# Patient Record
Sex: FEMALE | Race: WHITE | Employment: PART TIME | URBAN - NONMETROPOLITAN AREA
[De-identification: names, ages, dates, MRNs, and addresses within clinical notes are randomized per-mention and may not be internally consistent; named-entity substitution may affect disease eponyms.]

---

## 2021-12-14 ENCOUNTER — HOSPITAL ENCOUNTER (INPATIENT)
Age: 21
LOS: 6 days | Discharge: HOME | DRG: 885 | End: 2021-12-20
Attending: PSYCHIATRY & NEUROLOGY | Admitting: PSYCHIATRY & NEUROLOGY
Payer: MEDICAID

## 2021-12-14 DIAGNOSIS — R45.851 SUICIDAL IDEATION: Primary | ICD-10-CM

## 2021-12-14 DIAGNOSIS — T74.21XA SEXUAL ASSAULT OF ADULT, INITIAL ENCOUNTER: ICD-10-CM

## 2021-12-14 LAB
ACETAMINOPHEN LEVEL: <15 UG/ML
ALBUMIN SERPL-MCNC: 4.2 G/DL (ref 3.5–5.2)
ALP BLD-CCNC: 68 U/L (ref 35–104)
ALT SERPL-CCNC: 9 U/L (ref 5–33)
AMPHETAMINE SCREEN, URINE: NEGATIVE
ANION GAP SERPL CALCULATED.3IONS-SCNC: 9 MMOL/L (ref 7–19)
AST SERPL-CCNC: 12 U/L (ref 5–32)
BARBITURATE SCREEN URINE: NEGATIVE
BASOPHILS ABSOLUTE: 0 K/UL (ref 0–0.2)
BASOPHILS RELATIVE PERCENT: 0.3 % (ref 0–1)
BENZODIAZEPINE SCREEN, URINE: NEGATIVE
BILIRUB SERPL-MCNC: <0.2 MG/DL (ref 0.2–1.2)
BILIRUBIN URINE: NEGATIVE
BLOOD, URINE: NEGATIVE
BUN BLDV-MCNC: 7 MG/DL (ref 6–20)
CALCIUM SERPL-MCNC: 9 MG/DL (ref 8.6–10)
CANNABINOID SCREEN URINE: POSITIVE
CHLORIDE BLD-SCNC: 104 MMOL/L (ref 98–111)
CLARITY: CLEAR
CO2: 24 MMOL/L (ref 22–29)
COCAINE METABOLITE SCREEN URINE: NEGATIVE
COLOR: YELLOW
CREAT SERPL-MCNC: 0.8 MG/DL (ref 0.5–0.9)
EOSINOPHILS ABSOLUTE: 0.1 K/UL (ref 0–0.6)
EOSINOPHILS RELATIVE PERCENT: 1.8 % (ref 0–5)
ETHANOL: <10 MG/DL (ref 0–0.08)
GFR AFRICAN AMERICAN: >59
GFR NON-AFRICAN AMERICAN: >60
GLUCOSE BLD-MCNC: 104 MG/DL (ref 74–109)
GLUCOSE URINE: NEGATIVE MG/DL
HCG QUALITATIVE: NEGATIVE
HCT VFR BLD CALC: 42.9 % (ref 37–47)
HEMOGLOBIN: 13.5 G/DL (ref 12–16)
IMMATURE GRANULOCYTES #: 0 K/UL
KETONES, URINE: NEGATIVE MG/DL
LEUKOCYTE ESTERASE, URINE: NEGATIVE
LYMPHOCYTES ABSOLUTE: 2.2 K/UL (ref 1.1–4.5)
LYMPHOCYTES RELATIVE PERCENT: 33 % (ref 20–40)
Lab: ABNORMAL
MCH RBC QN AUTO: 28.2 PG (ref 27–31)
MCHC RBC AUTO-ENTMCNC: 31.5 G/DL (ref 33–37)
MCV RBC AUTO: 89.7 FL (ref 81–99)
MONOCYTES ABSOLUTE: 0.4 K/UL (ref 0–0.9)
MONOCYTES RELATIVE PERCENT: 5.7 % (ref 0–10)
NEUTROPHILS ABSOLUTE: 3.9 K/UL (ref 1.5–7.5)
NEUTROPHILS RELATIVE PERCENT: 59 % (ref 50–65)
NITRITE, URINE: NEGATIVE
OPIATE SCREEN URINE: NEGATIVE
PDW BLD-RTO: 13.7 % (ref 11.5–14.5)
PH UA: 7 (ref 5–8)
PLATELET # BLD: 178 K/UL (ref 130–400)
PMV BLD AUTO: 12.9 FL (ref 9.4–12.3)
POTASSIUM SERPL-SCNC: 4.1 MMOL/L (ref 3.5–5)
PROTEIN UA: NEGATIVE MG/DL
RBC # BLD: 4.78 M/UL (ref 4.2–5.4)
SALICYLATE, SERUM: 3.6 MG/DL (ref 3–10)
SARS-COV-2, NAAT: NOT DETECTED
SODIUM BLD-SCNC: 137 MMOL/L (ref 136–145)
SPECIFIC GRAVITY UA: 1.01 (ref 1–1.03)
TOTAL PROTEIN: 7.1 G/DL (ref 6.6–8.7)
UROBILINOGEN, URINE: 0.2 E.U./DL
WBC # BLD: 6.5 K/UL (ref 4.8–10.8)

## 2021-12-14 PROCEDURE — 80179 DRUG ASSAY SALICYLATE: CPT

## 2021-12-14 PROCEDURE — 87635 SARS-COV-2 COVID-19 AMP PRB: CPT

## 2021-12-14 PROCEDURE — 1240000000 HC EMOTIONAL WELLNESS R&B

## 2021-12-14 PROCEDURE — 80143 DRUG ASSAY ACETAMINOPHEN: CPT

## 2021-12-14 PROCEDURE — 84703 CHORIONIC GONADOTROPIN ASSAY: CPT

## 2021-12-14 PROCEDURE — 82077 ASSAY SPEC XCP UR&BREATH IA: CPT

## 2021-12-14 PROCEDURE — 80307 DRUG TEST PRSMV CHEM ANLYZR: CPT

## 2021-12-14 PROCEDURE — 81003 URINALYSIS AUTO W/O SCOPE: CPT

## 2021-12-14 PROCEDURE — 80053 COMPREHEN METABOLIC PANEL: CPT

## 2021-12-14 PROCEDURE — 99284 EMERGENCY DEPT VISIT MOD MDM: CPT

## 2021-12-14 PROCEDURE — 85025 COMPLETE CBC W/AUTO DIFF WBC: CPT

## 2021-12-14 PROCEDURE — 36415 COLL VENOUS BLD VENIPUNCTURE: CPT

## 2021-12-14 RX ORDER — BUPROPION HYDROCHLORIDE 100 MG/1
100 TABLET, EXTENDED RELEASE ORAL 2 TIMES DAILY
Status: ON HOLD | COMMUNITY
End: 2021-12-20 | Stop reason: HOSPADM

## 2021-12-14 ASSESSMENT — ENCOUNTER SYMPTOMS: SHORTNESS OF BREATH: 0

## 2021-12-15 PROBLEM — F32.A DEPRESSION WITH SUICIDAL IDEATION: Status: ACTIVE | Noted: 2021-12-15

## 2021-12-15 PROBLEM — F60.3 BORDERLINE PERSONALITY DISORDER (HCC): Status: ACTIVE | Noted: 2021-12-15

## 2021-12-15 PROBLEM — R45.851 DEPRESSION WITH SUICIDAL IDEATION: Status: ACTIVE | Noted: 2021-12-15

## 2021-12-15 PROCEDURE — 90792 PSYCH DIAG EVAL W/MED SRVCS: CPT | Performed by: NURSE PRACTITIONER

## 2021-12-15 PROCEDURE — 6370000000 HC RX 637 (ALT 250 FOR IP): Performed by: PSYCHIATRY & NEUROLOGY

## 2021-12-15 PROCEDURE — 6370000000 HC RX 637 (ALT 250 FOR IP): Performed by: NURSE PRACTITIONER

## 2021-12-15 PROCEDURE — 1240000000 HC EMOTIONAL WELLNESS R&B

## 2021-12-15 RX ORDER — LAMOTRIGINE 25 MG/1
25 TABLET ORAL DAILY
Status: DISCONTINUED | OUTPATIENT
Start: 2021-12-15 | End: 2021-12-17

## 2021-12-15 RX ORDER — BUPROPION HYDROCHLORIDE 100 MG/1
100 TABLET, EXTENDED RELEASE ORAL 2 TIMES DAILY
Status: DISCONTINUED | OUTPATIENT
Start: 2021-12-15 | End: 2021-12-15

## 2021-12-15 RX ORDER — BUPROPION HYDROCHLORIDE 100 MG/1
100 TABLET, EXTENDED RELEASE ORAL 2 TIMES DAILY
Status: DISCONTINUED | OUTPATIENT
Start: 2021-12-15 | End: 2021-12-17

## 2021-12-15 RX ORDER — TRAZODONE HYDROCHLORIDE 50 MG/1
50 TABLET ORAL NIGHTLY
Status: DISCONTINUED | OUTPATIENT
Start: 2021-12-15 | End: 2021-12-20 | Stop reason: HOSPADM

## 2021-12-15 RX ADMIN — TRAZODONE HYDROCHLORIDE 50 MG: 50 TABLET ORAL at 01:15

## 2021-12-15 RX ADMIN — BUPROPION HYDROCHLORIDE 100 MG: 100 TABLET, FILM COATED, EXTENDED RELEASE ORAL at 21:19

## 2021-12-15 RX ADMIN — LAMOTRIGINE 25 MG: 25 TABLET ORAL at 13:18

## 2021-12-15 RX ADMIN — TRAZODONE HYDROCHLORIDE 50 MG: 50 TABLET ORAL at 21:19

## 2021-12-15 ASSESSMENT — SLEEP AND FATIGUE QUESTIONNAIRES
DO YOU USE A SLEEP AID: NO
AVERAGE NUMBER OF SLEEP HOURS: 10
DO YOU HAVE DIFFICULTY SLEEPING: NO

## 2021-12-15 ASSESSMENT — LIFESTYLE VARIABLES: HISTORY_ALCOHOL_USE: NO

## 2021-12-15 ASSESSMENT — PATIENT HEALTH QUESTIONNAIRE - PHQ9: SUM OF ALL RESPONSES TO PHQ QUESTIONS 1-9: 24
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
## 2021-12-15 NOTE — PROGRESS NOTES
Group Therapy Note    Start Time: 800  End Time:  683  Number of Participants: 8    Type of Group: Community Meeting       Patient's Goal:        Notes:  Did not participate      Participation Level:       Participation Quality:        Thought Process/Content:       Affective Functioning:       Mood:       Level of consciousness:        Modes of Intervention: Support      Discipline Responsible: Behavioral Health Tech II      Signature:  Pan Naik

## 2021-12-15 NOTE — PROGRESS NOTES
Requirement Note     SW met with pt to complete Psychosocial and CSSR-S on this date. Patients long and short term goals discussed. Patient voiced understanding. Treatment plan sheet signed. Patient verbalized understanding of the treatment plan. Patient participated in goals and objectives of the treatment plan. Patient completed safety plan with , patient received copy of plan, and original was placed into patient's chart. SW explained treatment goals with pt. Decreasing depression and anxiety by improvement of positive coping patterns was discussed. Pt acknowledged understanding of treatment goals and signed treatment plan signature sheet. In the last 6 months has the pt been a danger to self: YES  In the last 6 months has the pt been a danger to others: NO  Legal Guardian/POA: NO     Provided patient with [employer] handout entitled \"Quitting Smoking. \"  Reviewed handout with patient: addressing dangers of smoking, developing coping skills, and providing basic information about quitting. Patient received all components practical counseling of tobacco practical counseling during the hospital stay.
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
## 2021-12-15 NOTE — PLAN OF CARE
Problem: Suicide risk  Goal: Provide patient with safe environment  Outcome: Ongoing     Problem: Discharge Planning:  Goal: Discharged to appropriate level of care  Outcome: Ongoing     Problem: Health Maintenance - Impaired:  Goal: Ability to perform activities of daily living will improve  Outcome: Ongoing  Goal: Able to sleep without medication for appropriate length of time  Outcome: Ongoing  Goal: Maintenance of adequate nutrition will improve  Outcome: Ongoing     Problem: Mood - Altered:  Goal: Mood stable  Outcome: Ongoing     Problem: Self-Esteem - Low:  Goal: Demonstrates positive self-esteem  Outcome: Ongoing     Problem: Cerebrospinal Fluid Leakage - Risk Of:  Goal: Demonstration of organized thought processes  Outcome: Ongoing     Problem: Violence - Risk of, Self/Other-Directed:  Goal: Knowledge of developmental care interventions  Outcome: Ongoing
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
## 2021-12-15 NOTE — PLAN OF CARE
Problem: Health Maintenance - Impaired:  Goal: Ability to perform activities of daily living will improve  12/15/2021 1153 by Mohini No  Outcome: Ongoing      Group Therapy Note     Date: 12/15/2021  Start Time: 1100  End Time:  8649  Number of Participants: 6     Type of Group: Psychoeducation     Wellness Binder Information  Module Name:  emotional wellness  Session Number:  1     Patient's Goal:  validation of feelings     Notes:  pt acknowledged to have feelings validated it may be necessary to share feeling with others.      Status After Intervention:  Improved     Participation Level: Interactive     Participation Quality: Appropriate, Attentive, and Sharing        Speech:  normal        Thought Process/Content: Logical        Affective Functioning: Congruent        Mood:  congruent        Level of consciousness:  Alert, Oriented x4, and Attentive        Response to Learning: Able to verbalize current knowledge/experience        Endings: None Reported     Modes of Intervention: Education        Discipline Responsible: Psychoeducational Specialist        Signature:  Mohini No No

## 2021-12-15 NOTE — PROGRESS NOTES
BHI Daily Shift Assessment  Nursing Progress Note    Room: [facility]/604-01 Name: Emely John Age: 24 y.o. Gender: female   Dx: <principal problem not specified>  Precautions: suicide risk  Target Symptoms:   Accu-Chek: NoSleep: Yes,Sleep Quality Good SI No AVH denies [address] [place] [address]  ADLs: Yes Speech: normal Depression: 2 Anxiety: 7   Participation LevelActive Listener and Interactive  Appetite: fair   Respiratory symptoms: No Headache: No Body aches: No Fever: No Cough: No  Patients encouraged to wear masks, wash hands frequently and practice social distancing while on the unit: Yes  Visitation: No   Participation QualityAppropriate, Attentive, Sharing and Supportive    Complaints:none    Notes: Patient is coherent, alert and oriented x 4. Pleasant, calm and cooperative. Friendly and social with peers. Attending groups. Appearance and attire is clean and appropriate. Well groomed. Affect is congruent. Thought process are linear and goal directed. Attentive with good eye contact during interview.     Signature: Electronically signed by J Luis Young RN on 12/15/21 at 10:09 AM CST
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
## 2021-12-15 NOTE — H&P
Behavioral Services  Medicare Certification Upon Admission    I certify that this patient's inpatient psychiatric hospital admission is medically necessary for:    [x] (1) Treatment which could reasonably be expected to improve this patient's condition,       [x] (2) Or for diagnostic study;     AND     [x](2) The inpatient psychiatric services are provided while the individual is under the care of a physician and are included in the individualized plan of care.     Estimated length of stay/service  3 days- 5 weeks    Plan for post-hospital care :TBA    Electronically signed by ESTER Powell on 12/15/2021 at 11:24 AM

## 2021-12-15 NOTE — ED NOTES
Report to shon bearden on behavioral health via telephone-all questions answered-pt updated on admission     Abdelrahman Ruiz RN  12/14/21 7152

## 2021-12-16 LAB
TSH SERPL DL<=0.05 MIU/L-ACNC: 2.84 UIU/ML (ref 0.27–4.2)
VITAMIN B-12: 281 PG/ML (ref 211–946)
VITAMIN D 25-HYDROXY: 9.5 NG/ML

## 2021-12-16 PROCEDURE — 84443 ASSAY THYROID STIM HORMONE: CPT

## 2021-12-16 PROCEDURE — 99231 SBSQ HOSP IP/OBS SF/LOW 25: CPT | Performed by: NURSE PRACTITIONER

## 2021-12-16 PROCEDURE — 82607 VITAMIN B-12: CPT

## 2021-12-16 PROCEDURE — 1240000000 HC EMOTIONAL WELLNESS R&B

## 2021-12-16 PROCEDURE — 6370000000 HC RX 637 (ALT 250 FOR IP): Performed by: NURSE PRACTITIONER

## 2021-12-16 PROCEDURE — 6370000000 HC RX 637 (ALT 250 FOR IP): Performed by: PSYCHIATRY & NEUROLOGY

## 2021-12-16 PROCEDURE — 6370000000 HC RX 637 (ALT 250 FOR IP): Performed by: FAMILY MEDICINE

## 2021-12-16 PROCEDURE — 36415 COLL VENOUS BLD VENIPUNCTURE: CPT

## 2021-12-16 PROCEDURE — 82306 VITAMIN D 25 HYDROXY: CPT

## 2021-12-16 RX ORDER — CHOLECALCIFEROL (VITAMIN D3) 125 MCG
500 CAPSULE ORAL DAILY
Status: DISCONTINUED | OUTPATIENT
Start: 2021-12-16 | End: 2021-12-20 | Stop reason: HOSPADM

## 2021-12-16 RX ORDER — ONDANSETRON 4 MG/1
4 TABLET, ORALLY DISINTEGRATING ORAL ONCE
Status: COMPLETED | OUTPATIENT
Start: 2021-12-16 | End: 2021-12-16

## 2021-12-16 RX ORDER — ERGOCALCIFEROL 1.25 MG/1
50000 CAPSULE ORAL WEEKLY
Status: DISCONTINUED | OUTPATIENT
Start: 2021-12-16 | End: 2021-12-20 | Stop reason: HOSPADM

## 2021-12-16 RX ADMIN — BUPROPION HYDROCHLORIDE 100 MG: 100 TABLET, FILM COATED, EXTENDED RELEASE ORAL at 08:47

## 2021-12-16 RX ADMIN — CYANOCOBALAMIN TAB 500 MCG 500 MCG: 500 TAB at 11:36

## 2021-12-16 RX ADMIN — ONDANSETRON 4 MG: 4 TABLET, ORALLY DISINTEGRATING ORAL at 11:36

## 2021-12-16 RX ADMIN — BUPROPION HYDROCHLORIDE 100 MG: 100 TABLET, FILM COATED, EXTENDED RELEASE ORAL at 21:01

## 2021-12-16 RX ADMIN — TRAZODONE HYDROCHLORIDE 50 MG: 50 TABLET ORAL at 21:01

## 2021-12-16 RX ADMIN — LAMOTRIGINE 25 MG: 25 TABLET ORAL at 08:47

## 2021-12-16 RX ADMIN — ERGOCALCIFEROL 50000 UNITS: 1.25 CAPSULE ORAL at 11:36

## 2021-12-16 NOTE — PLAN OF CARE
Problem: Altered Mood, Depressive Behavior:  Goal: Able to verbalize acceptance of life and situations over which he or she has no control  Description: Able to verbalize acceptance of life and situations over which he or she has no control  Outcome: Ongoing  Note:                                                                     Group Therapy Note    Date: 12/16/2021  Start Time: 1000  End Time:  1030  Number of Participants: 8    Type of Group: Psychoeducation    Wellness Binder Information  Module Name:  Men's Issues  Session Number:  1    Group Goal for Pt: To improve knowledge of effective stress management techniques    Notes:  Pt demonstrated improved knowledge of effective stress management techniques by actively participating in group discussion. Status After Intervention:  Unchanged    Participation Level:  Active Listener and Interactive    Participation Quality: Appropriate and Attentive      Speech:  normal      Thought Process/Content: Logical      Affective Functioning: Congruent      Mood: anxious and depressed      Level of consciousness:  Alert and Oriented x4      Response to Learning: Able to verbalize current knowledge/experience, Able to verbalize/acknowledge new learning, and Progressing to goal      Endings: None Reported    Modes of Intervention: Education      Discipline Responsible: Psychoeducational Specialist      Signature:  Bibiana Delatorre

## 2021-12-16 NOTE — PROGRESS NOTES
BHI Daily Shift Assessment  Nursing Progress Note    Room: [facility]/604-01 Name: Kathie Phoenix Age: 24 y.o. Ethnicity:  Gender: female   Dx: Severe episode of recurrent major depressive disorder, without psychotic features ([facility] [address]  Precautions: suicide risk  CPAP: No Accu-Chek: No  MSE:  Status and Exam  Normal: Yes  Facial Expression: Flat, Sad  Affect: Appropriate  Level of Consciousness: Alert  Mood:Normal: No  Mood: Depressed, Anxious  Motor Activity:Normal: No  Motor Activity: Decreased  Interview Behavior: Cooperative  Preception: Christine to Person, Idelia Patron to Time, Christine to Place, Christine to Situation  Attention:Normal: Yes  Attention: Distractible  Thought Processes: Circumstantial  Thought Content:Normal: Yes  Thought Content: Preoccupations  Hallucinations: None  Delusions: No  Memory:Normal: Yes  Insight and Judgment: No  Insight and Judgment: Poor Judgment, Poor Insight  Present Suicidal Ideation: No  Present Homicidal Ideation: No  Sleep: Yes, Good, has restless sleep Hours Slept: 8 Sched Sleep Meds: Yes PRN Sleep Meds: Yes Other PRN Meds: No Med Compliant: Yes Appetite: good Percent Meals: 75% Social: Yes ADLs: Yes Speech: normal Depression: 9 Anxiety: 0    Reports trouble falling asleep but slept well after. She is did not eat breakfast but did eat lunch. She is calm and cooperative with peers and staff. She has attended groups and participates. She denies SI, HI and AVH.       Loco Roach RN

## 2021-12-16 NOTE — PROGRESS NOTES
Group Note    Number of Participants in Group: 9  Number of Patients on Unit:10      Patient attended group:Yes  Reason for Absence:  Intervention for patient absence:        Type of Group:   Wrap-Up/Relaxation    Patient's Goal: See wrap up group sheet    Participation Level:    Minimal           Patient Response to Learning: Yes    Patient's Behavior: Pleasant    Is Patient Social/Interacting: Yes    Relaxation:   Television:No   Reading:No   Game/Puzzle:Yes   Phone: No       Notes/Comments:      Please see patient's wrap up group sheet for patient's comments       Electronically signed by Joseph Hull RN on 12/16/21 at 1:31 AM CST

## 2021-12-16 NOTE — PROGRESS NOTES
[facility] Adult Unit Daily Assessment  Nursing Progress Note    Room: [facility]/604-01   Name: Clark Bello   Age: 24 y.o. Gender: female   Dx: Severe episode of recurrent major depressive disorder, without psychotic features ([facility] [address]  Precautions: suicide risk  Inpatient Status: voluntary       SLEEP:    Sleep Quality Good  Sleep Medications: Yes   PRN Sleep Meds: No       MEDICAL:    Other PRN Meds: No   Med Compliant: Yes  Accu-Chek: No  Oxygen/CPAP/BiPAP: Yes  CIWA/CINA: No   PAIN Assessment: none  Side Effects from medication: No    Is Patient experiencing any respiratory symptoms (headache, fever, body aches, cough. Maryellen Vann ): no  Patient educated by nursing to practice social distancing, wear masks, wash hands frequently: yes      PSYCH:    Depression: 8   Anxiety: 8   SI denies suicidal ideation   HI Negative for homicidal ideation      AVH:Absent      GENERAL:    Appetite: good    Social: Yes   Speech: normal   Appearance: appropriately dressed and healthy looking    GROUP:    Group Participation: Yes  Participation Quality: Interactive    Notes: Pt was seen in dayroom playing cards with peers. Pt says she has high anxiety and high depression. Pt denies SI,HI,AVH. Pt says she has no thoughts of self harm today. Pt has been social and went to group.

## 2021-12-16 NOTE — PROGRESS NOTES
[address] [place] [address]      Psychiatric Progress Note    Name:  Creighton Fothergill  Date:  12/16/2021  Age:  24 y.o. Sex:  female  Ethnicity:   Primary Care Physician:  No primary care provider on file. Patient Care Team:  No care team member to display  Chief Complaint: \" I am feeling depressed today. \"        Historian:patient  Complaint Type: anxiety, decreased appetite, depression, fatigue, loss of interest in favorite activities and sleep disturbance  Course of Symptoms: ongoing  Precipitating Factors: history of mental illness         Subjective  Nursing notes were reviewed and patient had no behavioral issues during the night. No as needed medications were administered during the night. Today she denies suicidal ideation, homicidal ideation and psychosis. Today she rates depression as a 5 and anxiety as a 4 on a 0-10 scale. She reports that she is \"feeling depressed\" today. She reports that she is thinking about her male friend that she is in love with however he does not feel the same way about her. She reports that she is feeling nauseous today. She has been laying in her bed this morning. Patient reports sleep as \"it was not that good. \" Patient has been calm and cooperative with staff and peers. Patient has been compliant with medications. Patient has been attending groups. Patient reports appetite as \"it has been fair. \" Patient reports no side effects from medications.       Objective  Vitals:    12/16/21 0806   BP: 102/75   Pulse: 67   Resp: 16   Temp: 97.7 °F (36.5 °C)   SpO2: 98%       Previous Psychiatric/Substance Use History      Medical History:  Past Medical History:   Diagnosis Date    Depression     PCOS (polycystic ovarian syndrome)         BOND History:   Social History     Substance and Sexual Activity   Alcohol Use Never         Social History     Substance and Sexual Activity   Drug Use Yes    Types: Marijuana (Weed)        Social History     Tobacco Use   Smoking Status Never Smoker   Smokeless Tobacco Never Used        Family History:     History reviewed. No pertinent family history. Mental Status:  Level of consciousness:  within normal limits and awake  Appearance:  well-appearing, street clothes, in chair, good grooming and good hygiene  Behavior/Motor:  no abnormalities noted  Attitude toward examiner:  cooperative, attentive and good eye contact  Speech:  normal rate and normal volume  Mood:  \" I am feeling depressed today. \"  Affect:  mood congruent  Thought processes:  linear and goal directed  Thought content:  Homocidal ideation : denies  Suicidal Ideation:  denies suicidal ideation  Delusions:  no evidence of delusions  Perceptual Disturbance:  denies any perceptual disturbance  Cognition:  oriented to person, place, and time  Concentration : good  Memory intact for recent and remote  Fund of knowledge:  average  Abstract thinking:  adequate  Insight: fair  Judgment:  fair     vitamin D  50,000 Units Oral Weekly    vitamin B-12  500 mcg Oral Daily    traZODone  50 mg Oral Nightly    influenza virus vaccine  0.5 mL IntraMUSCular Prior to discharge    buPROPion  100 mg Oral BID    lamoTRIgine  25 mg Oral Daily       Current Medications:  Current Facility-Administered Medications   Medication Dose Route Frequency Provider Last Rate Last Admin    vitamin D (ERGOCALCIFEROL) capsule 50,000 Units  50,000 Units Oral Weekly Mago Hernandez MD   50,000 Units at 12/16/21 1136    vitamin B-12 (CYANOCOBALAMIN) tablet 500 mcg  500 mcg Oral Daily Mago Hernandez MD   500 mcg at 12/16/21 1136    traZODone (DESYREL) tablet 50 mg  50 mg Oral Nightly Robel Weinstein MD   50 mg at 12/15/21 2119    influenza quadrivalent split vaccine (FLUZONE;FLUARIX;FLULAVAL;AFLURIA) injection 0.5 mL  0.5 mL IntraMUSCular Prior to discharge Robel Weinstein MD        buPROPion [facility]) extended release tablet 100 mg  100 mg Oral BID Rise Fails, APRN   100 mg at 12/16/21 0847    lamoTRIgine (LAMICTAL) tablet 25 mg  25 mg Oral Daily ESTER Munroe   25 mg at 12/16/21 5312       Psychotherapy:   SUPPORTIVE    DSM V Diagnoses:      Principal Problem:    Severe episode of recurrent major depressive disorder, without psychotic features ([facility] [address]  Active Problems:    Cannabis use disorder, severe, dependence ([address]    Borderline personality disorder ([facility] [address]    Depression with suicidal ideation    Suicidal ideation  Resolved Problems:    * No resolved hospital problems. *            Plan:    Encourage group therapy  15 minute safety checks  Medical monitoring by Dr. Quintin Judge and associates  Continue current therapy and medications  Will increase Lamictal to 50 mg po daily    Amount of time spent with patient:  15 minutes with greater than 50% of the time spent in counseling and collaboration of care.     ESTER Munroe  Clinician Signature: signed electronically

## 2021-12-16 NOTE — PLAN OF CARE
Problem: Suicide risk  Description: Suicide risk  Goal: Provide patient with safe environment  Description: Provide patient with safe environment  Outcome: Ongoing     Problem: Discharge Planning:  Goal: Discharged to appropriate level of care  Description: Discharged to appropriate level of care  Outcome: Ongoing     Problem: Health Maintenance - Impaired:  Goal: Ability to perform activities of daily living will improve  Description: Ability to perform activities of daily living will improve  Outcome: Ongoing  Goal: Able to sleep without medication for appropriate length of time  Description: Able to sleep without medication for appropriate length of time  Outcome: Ongoing  Goal: Maintenance of adequate nutrition will improve  Description: Maintenance of adequate nutrition will improve  Outcome: Ongoing     Problem: Mood - Altered:  Goal: Mood stable  Description: Mood stable  Outcome: Ongoing     Problem: Self-Esteem - Low:  Goal: Demonstrates positive self-esteem  Description: Demonstrates positive self-esteem  Outcome: Ongoing     Problem: Cerebrospinal Fluid Leakage - Risk Of:  Goal: Demonstration of organized thought processes  Description: Demonstration of organized thought processes  Outcome: Ongoing     Problem: Violence - Risk of, Self/Other-Directed:  Goal: Knowledge of developmental care interventions  Description: Absence of violence  Outcome: Ongoing

## 2021-12-16 NOTE — PROGRESS NOTES
Group Therapy Note    Start Time: 800  End Time:  550  Number of Participants: 8    Type of Group: Community Meeting       Patient's Goal:        Notes:  Did not participate      Participation Level:       Participation Quality:        Thought Process/Content:       Affective Functioning:       Mood:       Level of consciousness:        Modes of Intervention: Support      Discipline Responsible: Behavioral Health Tech II      Signature:  Nish Patel
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
## 2021-12-16 NOTE — H&P
HISTORY and PHYSICAL      CHIEF COMPLAINT:  Depression, SI    Reason for Admission:  Depression, SI    History Obtained From:  Patient, chart    HISTORY OF PRESENT ILLNESS:      The patient is a 24 y.o. female who is admitted to the [place] [address] unit with worsening mood issues. SHe has no c/o CP or SOA. No abdominal pain or N/V. No dysuria. No new pain issues. No fevers. Past Medical History:        Diagnosis Date    Depression     PCOS (polycystic ovarian syndrome)      Past Surgical History:        Procedure Laterality Date    CYST REMOVAL      TONSILLECTOMY           Medications Prior to Admission:    Medications Prior to Admission: buPROPion (WELLBUTRIN SR) 100 MG extended release tablet, Take 100 mg by mouth 2 times daily    Allergies:  Milk-related compounds    Social History:   TOBACCO:   reports that she has never smoked. She has never used smokeless tobacco.  ETOH:   reports no history of alcohol use. DRUGS:   reports current drug use. Drug: Marijuana Desiree Abrahan). MARITAL STATUS:  single  OCCUPATION:  In school and working  Patient currently lives with room mates      Family History:   History reviewed. No pertinent family history. REVIEW OF SYSTEMS:  Constitutional: neg  CV: neg  Pulmonary: neg  GI: neg  : neg  Psych: depression, SI  Neuro: neg  Skin: neg  MusculoSkeletal: neg  HEENT: neg  Joints: neg    Vitals:  BP 98/62   Pulse 67   Temp 96.6 °F (35.9 °C) (Temporal)   Resp 16   Ht 5' 6\" (1.676 m)   Wt 259 lb (117.5 kg)   LMP 12/07/2021 (Approximate)   SpO2 96%   BMI 41.80 kg/m²     PHYSICAL EXAM:  Gen: NAD, alert  HEENT: WNL  Lymph: no LAD  Neck: no JVD or masses  Chest: CTA bilat  CV: RRR  Abdomen: NT/ND  Extrem: no C/C/E  Neuro: non focal  Skin: no rashes  Joints: no redness    DATA:  I have reviewed the admission labs and imaging tests.     ASSESSMENT AND PLAN:      Principal Problem:    Severe episode of recurrent major depressive disorder, without psychotic features, SI----follow with Psych    THC use      Nancy Robison MD  10:49 PM 12/15/2021

## 2021-12-17 PROCEDURE — 6370000000 HC RX 637 (ALT 250 FOR IP): Performed by: NURSE PRACTITIONER

## 2021-12-17 PROCEDURE — 6370000000 HC RX 637 (ALT 250 FOR IP): Performed by: PSYCHIATRY & NEUROLOGY

## 2021-12-17 PROCEDURE — 1240000000 HC EMOTIONAL WELLNESS R&B

## 2021-12-17 PROCEDURE — 99231 SBSQ HOSP IP/OBS SF/LOW 25: CPT | Performed by: NURSE PRACTITIONER

## 2021-12-17 PROCEDURE — 6370000000 HC RX 637 (ALT 250 FOR IP): Performed by: FAMILY MEDICINE

## 2021-12-17 RX ORDER — LAMOTRIGINE 25 MG/1
50 TABLET ORAL DAILY
Status: DISCONTINUED | OUTPATIENT
Start: 2021-12-18 | End: 2021-12-20 | Stop reason: HOSPADM

## 2021-12-17 RX ORDER — BUPROPION HYDROCHLORIDE 150 MG/1
150 TABLET, EXTENDED RELEASE ORAL 2 TIMES DAILY
Status: DISCONTINUED | OUTPATIENT
Start: 2021-12-17 | End: 2021-12-20 | Stop reason: HOSPADM

## 2021-12-17 RX ADMIN — BUPROPION HYDROCHLORIDE 150 MG: 150 TABLET, EXTENDED RELEASE ORAL at 21:37

## 2021-12-17 RX ADMIN — LAMOTRIGINE 25 MG: 25 TABLET ORAL at 07:58

## 2021-12-17 RX ADMIN — TRAZODONE HYDROCHLORIDE 50 MG: 50 TABLET ORAL at 21:37

## 2021-12-17 RX ADMIN — BUPROPION HYDROCHLORIDE 100 MG: 100 TABLET, FILM COATED, EXTENDED RELEASE ORAL at 07:58

## 2021-12-17 RX ADMIN — CYANOCOBALAMIN TAB 500 MCG 500 MCG: 500 TAB at 07:58

## 2021-12-17 NOTE — PROGRESS NOTES
Progress Note  [facility]  12/17/2021 7:30 AM  Subjective:   Admit Date:   12/14/2021      CC/ADMIT DX:       Interval History:   Reviewed overnight events and nursing notes. She has no new medical issues. I have reviewed all labs/diagnostics from the last 24hrs. ROS:   I have done a 10 point ROS and all are negative, except what is mentioned in the HPI. ADULT DIET; Regular    Medications:      vitamin D  50,000 Units Oral Weekly    vitamin B-12  500 mcg Oral Daily    traZODone  50 mg Oral Nightly    influenza virus vaccine  0.5 mL IntraMUSCular Prior to discharge    buPROPion  100 mg Oral BID    lamoTRIgine  25 mg Oral Daily           Objective:   Vitals: BP 93/81   Pulse 74   Temp 97.5 °F (36.4 °C) (Temporal)   Resp 18   Ht 5' 6\" (1.676 m)   Wt 259 lb (117.5 kg)   LMP 12/07/2021 (Approximate)   SpO2 98%   BMI 41.80 kg/m²  No intake or output data in the 24 hours ending 12/17/21 0730  General appearance: alert and cooperative with exam  Extremities: extremities normal, atraumatic, no cyanosis or edema  Neurologic:  No obvious focal neurologic deficits. Skin: no rashes    Assessment and Plan:   Principal Problem:    Severe episode of recurrent major depressive disorder, without psychotic features ([address]  Active Problems:    Depression with suicidal ideation    Suicidal ideation    Cannabis use disorder, severe, dependence ([address]    Borderline personality disorder ([address]  Resolved Problems:    * No resolved hospital problems. *    Vit D Def    Plan:  1. Continue present medication(s)   2. Replace Vit D  3. Follow with Psych      Discharge planning:   her home     Reviewed treatment plans with the patient and/or family.              Electronically signed by Soheila Farris MD on 12/17/2021 at 7:30 AM

## 2021-12-17 NOTE — PROGRESS NOTES
[facility] Adult Unit Daily Assessment  Nursing Progress Note    Room: [facility]/604-01   Name: Denisha Guardado   Age: 24 y.o. Gender: female   Dx: Severe episode of recurrent major depressive disorder, without psychotic features ([address]  Precautions: suicide risk  Inpatient Status: voluntary       SLEEP:    Sleep Quality Good  Sleep Medications: Yes   PRN Sleep Meds: No       MEDICAL:    Other PRN Meds: No   Med Compliant: Yes  Accu-Chek: no  Oxygen/CPAP/BiPAP: No  CIWA/CINA: No   PAIN Assessment: none  Side Effects from medication: No    Is Patient experiencing any respiratory symptoms (headache, fever, body aches, cough. Robby Singer ): no  Patient educated by nursing to practice social distancing, wear masks, wash hands frequently: yes      PSYCH:    Depression: 8   Anxiety: 0   SI denies suicidal ideation   HI Negative for homicidal ideation      AVH:Absent      GENERAL:    Appetite: good    Social: Yes   Speech: normal   Appearance: appropriately dressed and healthy looking    GROUP:    Group Participation: Yes  Participation Quality: Interactive    Notes: Pt was seen in dayroom. Pt has a bright affect but says she is severely depressed. Pt denies anxiety. Pt is denying self harm thoughts. Pt denies HI,AVH. Pt has been very social and goes to groups.

## 2021-12-17 NOTE — PROGRESS NOTES
[facility] Daily Shift Assessment  Nursing Progress Note    Room: [facility]/604-01 Name: Creighton Fothergill Age: 24 y.o. Ethnicity:  Gender: female   Dx: Severe episode of recurrent major depressive disorder, without psychotic features ([address]  Precautions: suicide risk  CPAP: No Accu-Chek: No  MSE:  Status and Exam  Normal: Yes  Facial Expression: Brightened  Affect: Appropriate  Level of Consciousness: Alert  Mood:Normal: No  Mood: Depressed  Motor Activity:Normal: Yes  Motor Activity: Decreased  Interview Behavior: Cooperative  Preception: Irene to Person, Irene to Place, Irene to Situation, Irene to Time  Attention:Normal: Yes  Attention: Distractible  Thought Processes: Circumstantial  Thought Content:Normal: Yes  Thought Content: Preoccupations  Hallucinations: None  Delusions: No  Memory:Normal: Yes  Insight and Judgment: No  Insight and Judgment: Poor Insight, Poor Judgment  Present Suicidal Ideation: No  Present Homicidal Ideation: No  Sleep: No, Poor, has interrupted sleep, has restless sleep and has frequent nighttime awakenings Hours Slept: 6 Sched Sleep Meds: Yes PRN Sleep Meds: Yes Other PRN Meds: Yes Med Compliant: Yes Appetite: good Percent Meals: 100% Social: Yes ADLs: Yes Speech: normal Depression: 6 Anxiety: 0      Reports poor sleep and wakening several times. Calm and cooperative with staff and peers. Social with staff and peers. Good appetite.  Attends groups and is medication compliant,  She denies SI, HI and AVH    Gina Mendes RN

## 2021-12-17 NOTE — PROGRESS NOTES
Group Note    Number of Participants in Group: 11  Number of Patients on Unit:11      Patient attended group:Yes  Reason for Absence:  Intervention for patient absence:        Type of Group:   Wrap-Up/Relaxation    Patient's Goal: See wrap up group sheet    Participation Level:    Interactive           Patient Response to Learning: Yes    Patient's Behavior: Pleasant    Is Patient Social/Interacting: Yes    Relaxation:   Television:Yes   Reading:Yes   Game/Puzzle:Yes   Phone: No       Notes/Comments:      Please see patient's wrap up group sheet for patient's comments

## 2021-12-17 NOTE — PLAN OF CARE
Problem: Health Maintenance - Impaired:  Goal: Ability to perform activities of daily living will improve  12/17/2021 1107 by Gloria Griffiths  Outcome: Ongoing      Group Therapy Note     Date: 12/17/2021  Start Time: 1000  End Time:  1030  Number of Participants: 9     Type of Group: Psychoeducation     Wellness Binder Information  Module Name:  Staying well  Session Number:  1     Patient's Goal:  daily maintenance coping skills     Notes:  pt acknowledged use of positive coping skills daily to help stay well.      Status After Intervention:  Improved     Participation Level: Interactive     Participation Quality: Appropriate, Attentive, and Sharing        Speech:  normal        Thought Process/Content: Logical        Affective Functioning: Congruent        Mood: congruent        Level of consciousness:  Alert, Oriented x4, and Attentive        Response to Learning: Able to verbalize current knowledge/experience        Endings: None Reported     Modes of Intervention: Education        Discipline Responsible: Psychoeducational Specialist        Signature:  Gloria Griffiths

## 2021-12-17 NOTE — PLAN OF CARE
Problem: Suicide risk  Description: Suicide risk  Goal: Provide patient with safe environment  Description: Provide patient with safe environment  Outcome: Ongoing     Problem: Discharge Planning:  Goal: Discharged to appropriate level of care  Description: Discharged to appropriate level of care  Outcome: Ongoing     Problem: Health Maintenance - Impaired:  Goal: Ability to perform activities of daily living will improve  Description: Ability to perform activities of daily living will improve  Outcome: Ongoing  Goal: Able to sleep without medication for appropriate length of time  Description: Able to sleep without medication for appropriate length of time  Outcome: Ongoing  Goal: Maintenance of adequate nutrition will improve  Description: Maintenance of adequate nutrition will improve  Outcome: Ongoing     Problem: Mood - Altered:  Goal: Mood stable  Description: Mood stable  Outcome: Ongoing     Problem: Self-Esteem - Low:  Goal: Demonstrates positive self-esteem  Description: Demonstrates positive self-esteem  Outcome: Ongoing     Problem: Cerebrospinal Fluid Leakage - Risk Of:  Goal: Demonstration of organized thought processes  Description: Demonstration of organized thought processes  Outcome: Ongoing     Problem: Violence - Risk of, Self/Other-Directed:  Goal: Knowledge of developmental care interventions  Description: Absence of violence  Outcome: Ongoing     Problem: Altered Mood, Depressive Behavior:  Goal: Able to verbalize acceptance of life and situations over which he or she has no control  Description: Able to verbalize acceptance of life and situations over which he or she has no control  Outcome: Ongoing
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
## 2021-12-17 NOTE — PROGRESS NOTES
limits and awake  Appearance:  well-appearing, street clothes, in chair, good grooming and good hygiene  Behavior/Motor:  no abnormalities noted  Attitude toward examiner:  cooperative, attentive and good eye contact  Speech:  normal rate and normal volume  Mood:  \" I am starting to feel a little better. \"  Affect:  mood congruent  Thought processes:  linear and goal directed  Thought content:  Homocidal ideation :denies  Suicidal Ideation:  passive  Delusions:  no evidence of delusions  Perceptual Disturbance:  denies any perceptual disturbance  Cognition:  oriented to person, place, and time  Concentration : good  Memory intact for recent and remote  Fund of knowledge: average  Abstract thinking:  adequate  Insight: good  Judgment:  good     [START ON 12/18/2021] lamoTRIgine  50 mg Oral Daily    buPROPion  150 mg Oral BID    vitamin D  50,000 Units Oral Weekly    vitamin B-12  500 mcg Oral Daily    traZODone  50 mg Oral Nightly    influenza virus vaccine  0.5 mL IntraMUSCular Prior to discharge       Current Medications:  Current Facility-Administered Medications   Medication Dose Route Frequency Provider Last Rate Last Admin    [START ON 12/18/2021] lamoTRIgine (LAMICTAL) tablet 50 mg  50 mg Oral Daily ESTER Zavala        buPROPion [facility]) extended release tablet 150 mg  150 mg Oral BID ESTER Zavala        vitamin D (ERGOCALCIFEROL) capsule 50,000 Units  50,000 Units Oral Weekly Derrick Najera MD   50,000 Units at 12/16/21 1136    vitamin B-12 (CYANOCOBALAMIN) tablet 500 mcg  500 mcg Oral Daily Derrick Najera MD   500 mcg at 12/17/21 0758    traZODone (DESYREL) tablet 50 mg  50 mg Oral Nightly Lisset Barajas MD   50 mg at 12/16/21 2101    influenza quadrivalent split vaccine (FLUZONE;FLUARIX;FLULAVAL;AFLURIA) injection 0.5 mL  0.5 mL IntraMUSCular Prior to discharge Lisset Barajas MD           Psychotherapy:   SUPPORTIVE    DSM V Diagnoses:    Principal Problem:    Severe episode of recurrent major depressive disorder, without psychotic features ([facility] [address]  Active Problems:    Cannabis use disorder, severe, dependence ([address]    Borderline personality disorder ([facility] [address]    Depression with suicidal ideation    Suicidal ideation  Resolved Problems:    * No resolved hospital problems. *            Plan:    Encourage group therapy  15 minute safety checks  Medical monitoring by Dr. Charles Major and associates  Continue current therapy and medications  Discharge on Monday     Amount of time spent with patient:  15 minutes with greater than 50% of the time spent in counseling and collaboration of care.     ESTER Poole  Clinician Signature: signed electronically

## 2021-12-17 NOTE — PLAN OF CARE
Problem: Health Maintenance - Impaired:  Goal: Ability to perform activities of daily living will improve  12/17/2021 1611 by Lindsey Gutierrez  Outcome: Ongoing      Group Therapy Note     Date: 12/17/2021  Start Time: 0447  End Time:  1600  Number of Participants: 6     Type of Group: Recovery     Wellness Binder Information  Module Name:  emotional wellness  Session Number:  5     Patient's Goal:  obstacles to emotional wellness      Notes:  pt acknowledged negative thinking as an obstacle to emotional wellness.      Status After Intervention:  Improved     Participation Level: Interactive     Participation Quality: Appropriate, Attentive, and Sharing        Speech:  normal        Thought Process/Content: Logical        Affective Functioning: Congruent        Mood: congruent        Level of consciousness:  Alert, Oriented x4, and Attentive        Response to Learning: Able to verbalize current knowledge/experience        Endings: None Reported     Modes of Intervention: Education        Discipline Responsible: Psychoeducational Specialist        Signature:  Lindsey Gutierrez

## 2021-12-18 PROCEDURE — 6370000000 HC RX 637 (ALT 250 FOR IP): Performed by: FAMILY MEDICINE

## 2021-12-18 PROCEDURE — 1240000000 HC EMOTIONAL WELLNESS R&B

## 2021-12-18 PROCEDURE — 6370000000 HC RX 637 (ALT 250 FOR IP): Performed by: NURSE PRACTITIONER

## 2021-12-18 PROCEDURE — 99233 SBSQ HOSP IP/OBS HIGH 50: CPT | Performed by: PSYCHIATRY & NEUROLOGY

## 2021-12-18 PROCEDURE — 6370000000 HC RX 637 (ALT 250 FOR IP): Performed by: PSYCHIATRY & NEUROLOGY

## 2021-12-18 RX ADMIN — TRAZODONE HYDROCHLORIDE 50 MG: 50 TABLET ORAL at 21:10

## 2021-12-18 RX ADMIN — CYANOCOBALAMIN TAB 500 MCG 500 MCG: 500 TAB at 08:00

## 2021-12-18 RX ADMIN — LAMOTRIGINE 50 MG: 25 TABLET ORAL at 08:00

## 2021-12-18 RX ADMIN — BUPROPION HYDROCHLORIDE 150 MG: 150 TABLET, EXTENDED RELEASE ORAL at 21:10

## 2021-12-18 RX ADMIN — BUPROPION HYDROCHLORIDE 150 MG: 150 TABLET, EXTENDED RELEASE ORAL at 08:00

## 2021-12-18 NOTE — PROGRESS NOTES
Group Note     Number of Participants in Group: 9  Number of Patients on Unit:9        Patient attended group:Yes  Reason for Absence:  Intervention for patient absence:         Type of Group:   Health/wellness     Patient's Goal: Complete safety plan sheet     Participation Level:   interactive             Patient Response to Learning: Yes     Patient's Behavior: Cooperative     Is Patient Social/Interacting: some     Relaxation:              Television:Yes              Reading:No              Game/Puzzle: Yes              Phone:  No       Notes/Comments:     patient attended group on safety plan and nursing education     Please see patient's safety plan sheet for patient's comments        Electronically signed by Jovanny Walker RN on 12/18/21 at 2:59 PM CST

## 2021-12-18 NOTE — PROGRESS NOTES
Group Therapy Note    Date: 12/18/2021  Start Time: 8am  End Time:  180  Number of Participants: 9    [facility] of Group: [facility] Information  Module Name:  Self inventory  Session Number:  na    Patient's Goal:  Self survey, menus, education    Notes:  Patient filled out self inventory sheet and menu    Status After Intervention:  Improved    Participation Level: Interactive    Participation Quality: Appropriate      Speech:  normal      Thought Process/Content: Linear      Affective Functioning: Congruent      Mood: depressed      Level of consciousness:  Oriented x4      Response to Learning: Able to verbalize current knowledge/experience      Endings: None Reported    Modes of Intervention: Education and Socialization      Discipline Responsible: Registered Nurse      Signature:   Juanita Xavier RN

## 2021-12-18 NOTE — PROGRESS NOTES
[facility] Adult Unit Daily Assessment  Nursing Progress Note    Room: [facility]/604-01   Name: Caroline Middleton   Age: 24 y.o. Gender: female   Dx: Severe episode of recurrent major depressive disorder, without psychotic features ([address]  Precautions: suicide risk  Inpatient Status: voluntary       SLEEP:    Sleep Quality Good  Sleep Medications:    PRN Sleep Meds:        MEDICAL:    Other PRN Meds: No   Med Compliant: Yes  Accu-Chek: No  Oxygen/CPAP/BiPAP: No  CIWA/CINA: No   PAIN Assessment: none  Side Effects from medication: No    Is Patient experiencing any respiratory symptoms (headache, fever, body aches, cough. Kirsty Remedies ): no  Patient educated by nursing to practice social distancing, wear masks, wash hands frequently: yes      PSYCH:    Depression: 4   Anxiety: 0   SI denies suicidal ideation   HI Negative for homicidal ideation      AVH:Absent      GENERAL:    Appetite: no change from normal    Social: not this morning   Speech: normal   Appearance: appropriately dressed, appropriately groomed and healthy looking    GROUP:    Group Participation: Yes  Participation Quality: Active Listener    Notes:     Patient is alert, oriented, calm and cooperative with staff. Patient came out this morning for breakfast but did not eat stating that she does not eat breakfast. patient did fill out her morning group sheet and is medication compliant. patient has fair eye contact during interview. Patient has a flat, sad affect during interview. Patient is worried and preoccupied with her medications. No obvious s/s of distress voiced or noted. Will continue to monitor.      Electronically signed by Heath Hawthorne RN on 12/18/21 at 9:24 AM CST

## 2021-12-18 NOTE — PLAN OF CARE
Problem: Suicide risk  Goal: Provide patient with safe environment  Description: Provide patient with safe environment  Outcome: Ongoing     Problem: Discharge Planning:  Goal: Discharged to appropriate level of care  Description: Discharged to appropriate level of care  Outcome: Ongoing     Problem: Health Maintenance - Impaired:  Goal: Ability to perform activities of daily living will improve  Description: Ability to perform activities of daily living will improve  Outcome: Ongoing  Goal: Able to sleep without medication for appropriate length of time  Description: Able to sleep without medication for appropriate length of time  Outcome: Ongoing  Goal: Maintenance of adequate nutrition will improve  Description: Maintenance of adequate nutrition will improve  Outcome: Ongoing     Problem: Mood - Altered:  Goal: Mood stable  Description: Mood stable  Outcome: Ongoing     Problem: Self-Esteem - Low:  Goal: Demonstrates positive self-esteem  Description: Demonstrates positive self-esteem  Outcome: Ongoing     Problem: Cerebrospinal Fluid Leakage - Risk Of:  Goal: Demonstration of organized thought processes  Description: Demonstration of organized thought processes  Outcome: Ongoing     Problem: Violence - Risk of, Self/Other-Directed:  Goal: Knowledge of developmental care interventions  Description: Absence of violence  Outcome: Ongoing     Problem: Altered Mood, Depressive Behavior:  Goal: Able to verbalize acceptance of life and situations over which he or she has no control  Description: Able to verbalize acceptance of life and situations over which he or she has no control  Outcome: Ongoing

## 2021-12-18 NOTE — PROGRESS NOTES
Group Note  Group Time 20:00 to 20:30    Number of Participants in Group: 8  Number of Patients on Unit:8      Patient attended group:Yes  Reason for Absence:  Intervention for patient absence:        Type of Group:   Wrap-Up/Relaxation    Patient's Goal: See wrap up group sheet    Participation Level: Active Listener  Patient Response to Learning: appropriate    Patient's Behavior: Cooperative and Pleasant    Is Patient Social/Interacting: Yes    Relaxation:   Television:Yes   Reading:Yes   Game/Puzzle:Yes   Phone:  Yes             Please see patient's wrap up group sheet for patient's comments

## 2021-12-18 NOTE — PROGRESS NOTES
Gait unremarkable. Speech: Normal in tone, volume, and quality. Mood: \"Good\"   Affect: Mood congruent. Range is full. Thought Process: Appears linear and goal oriented. Thought Content: Patient does not have any current active suicidal and homicidal ideations. No overt delusions or paranoia appreciated. Perceptions: Seems patient does not have any auditory or visual hallucinations at present time. Patient did not appear to be responding to internal stimuli. No overt psychosis. Orientation: to person, place, date, and situation. Alert. Impulsivity: Improved. Neurovegitative: Good appetite, good sleep  Insight: Improved  Judgment: Improved    Data  No new lab test results to review    Medications  Current Facility-Administered Medications: lamoTRIgine (LAMICTAL) tablet 50 mg, 50 mg, Oral, Daily  buPROPion (WELLBUTRIN SR) extended release tablet 150 mg, 150 mg, Oral, BID  vitamin D (ERGOCALCIFEROL) capsule 50,000 Units, 50,000 Units, Oral, Weekly  vitamin B-12 (CYANOCOBALAMIN) tablet 500 mcg, 500 mcg, Oral, Daily  traZODone (DESYREL) tablet 50 mg, 50 mg, Oral, Nightly  influenza quadrivalent split vaccine (FLUZONE;FLUARIX;FLULAVAL;AFLURIA) injection 0.5 mL, 0.5 mL, IntraMUSCular, Prior to discharge    ASSESSMENT AND PLAN    DSM 5 Diagnoses:    Major depressive disorder, current, severe, without psychotic features  Borderline personality disorder  Posttraumatic stress disorder  Cannabis use disorder  Vitamin B12 deficiency  Vitamin D deficiency      Plan:   1. Psychiatric Medications:   Continue current psychotropic medications as recommended. Patient denies side effect of currently prescribed medications. No changes with dose of prescribed psychotropic medications today. 2. Medical Issues:    Continue medical monitoring by Dr. Macy Whiting and associates.       3. Disposition:    Discharge patient home when she will be in stable mental condition and adjustment psychotropic medications will be done.  Preliminary day of discharge is on Monday on 12/19/2021.      Amount of time spent with patient:    35 minutes with greater than 50% of the time spent in counseling and collaboration of care

## 2021-12-18 NOTE — PROGRESS NOTES
Group Note    Number of Participants in Group: 9  Number of Patients on Unit:9      Patient attended group:Yes  Reason for Absence:  Intervention for patient absence:        Type of Group:  SELF INVENTORY    Patient's Goal: See self inventory group sheet    Participation Level:     Active Listener           Patient Response to Learning: Yes    Patient's Behavior: Cooperative    Is Patient Social/Interacting: Yes some     Relaxation:   Television:Yes   Reading:Yes   Game/Puzzle:Yes   Phone: Yes       Notes/Comments:      Please see patient's self inventory group sheet for patient's comments       Electronically signed by Adam Neves RN on 12/18/21 at 2:25 PM CST
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
## 2021-12-18 NOTE — PROGRESS NOTES
Progress Note  Sandra Ceballos  12/18/2021 12:00 AM  Subjective:   Admit Date:   12/14/2021      CC/ADMIT DX:       Interval History:   Reviewed overnight events and nursing notes. She denies any new medical issues. I have reviewed all labs/diagnostics from the last 24hrs. ROS:   I have done a 10 point ROS and all are negative, except what is mentioned in the HPI. ADULT DIET; Regular    Medications:      lamoTRIgine  50 mg Oral Daily    buPROPion  150 mg Oral BID    vitamin D  50,000 Units Oral Weekly    vitamin B-12  500 mcg Oral Daily    traZODone  50 mg Oral Nightly    influenza virus vaccine  0.5 mL IntraMUSCular Prior to discharge           Objective:   Vitals: /79   Pulse 68   Temp 97.3 °F (36.3 °C) (Temporal)   Resp 18   Ht 5' 6\" (1.676 m)   Wt 259 lb (117.5 kg)   LMP 12/07/2021 (Approximate)   SpO2 94%   BMI 41.80 kg/m²  No intake or output data in the 24 hours ending 12/18/21 0000  General appearance: alert and cooperative with exam  Extremities: extremities normal, atraumatic, no cyanosis or edema  Neurologic:  No obvious focal neurologic deficits. Skin: no rashes    Assessment and Plan:   Principal Problem:    Severe episode of recurrent major depressive disorder, without psychotic features ([address]  Active Problems:    Depression with suicidal ideation    Suicidal ideation    Cannabis use disorder, severe, dependence ([address]    Borderline personality disorder ([address]  Resolved Problems:    * No resolved hospital problems. *    Vit D Def    Plan:  1. Continue present medication(s)   2. She is medically stable. I will monitor for any changes or concerns. 3.  Follow with Psych      Discharge planning:   her home     Reviewed treatment plans with the patient and/or family.              Electronically signed by Gilmar Willoughby MD on 12/18/2021 at 12:00 AM

## 2021-12-19 PROCEDURE — 90686 IIV4 VACC NO PRSV 0.5 ML IM: CPT | Performed by: PSYCHIATRY & NEUROLOGY

## 2021-12-19 PROCEDURE — 6370000000 HC RX 637 (ALT 250 FOR IP): Performed by: NURSE PRACTITIONER

## 2021-12-19 PROCEDURE — 6360000002 HC RX W HCPCS: Performed by: PSYCHIATRY & NEUROLOGY

## 2021-12-19 PROCEDURE — 6370000000 HC RX 637 (ALT 250 FOR IP): Performed by: FAMILY MEDICINE

## 2021-12-19 PROCEDURE — 6370000000 HC RX 637 (ALT 250 FOR IP): Performed by: PSYCHIATRY & NEUROLOGY

## 2021-12-19 PROCEDURE — 1240000000 HC EMOTIONAL WELLNESS R&B

## 2021-12-19 PROCEDURE — G0008 ADMIN INFLUENZA VIRUS VAC: HCPCS | Performed by: PSYCHIATRY & NEUROLOGY

## 2021-12-19 RX ADMIN — BUPROPION HYDROCHLORIDE 150 MG: 150 TABLET, EXTENDED RELEASE ORAL at 08:17

## 2021-12-19 RX ADMIN — BUPROPION HYDROCHLORIDE 150 MG: 150 TABLET, EXTENDED RELEASE ORAL at 21:21

## 2021-12-19 RX ADMIN — TRAZODONE HYDROCHLORIDE 50 MG: 50 TABLET ORAL at 21:21

## 2021-12-19 RX ADMIN — LAMOTRIGINE 50 MG: 25 TABLET ORAL at 08:17

## 2021-12-19 RX ADMIN — INFLUENZA A VIRUS A/VICTORIA/2570/2019 IVR-215 (H1N1) ANTIGEN (PROPIOLACTONE INACTIVATED), INFLUENZA A VIRUS A/CAMBODIA/E0826360/2020 IVR-224 (H3N2) ANTIGEN (PROPIOLACTONE INACTIVATED), INFLUENZA B VIRUS B/VICTORIA/705/2018 BVR-11 ANTIGEN (PROPIOLACTONE INACTIVATED), INFLUENZA B VIRUS B/PHUKET/3073/2013 BVR-1B ANTIGEN (PROPIOLACTONE INACTIVATED) 0.5 ML: 15; 15; 15; 15 INJECTION, SUSPENSION INTRAMUSCULAR at 12:59

## 2021-12-19 RX ADMIN — CYANOCOBALAMIN TAB 500 MCG 500 MCG: 500 TAB at 08:17

## 2021-12-19 ASSESSMENT — PAIN SCALES - GENERAL: PAINLEVEL_OUTOF10: 0

## 2021-12-19 NOTE — PROGRESS NOTES
Progress Note  Cathy Okeefe  12/19/2021 12:58 PM  Subjective:   Admit Date:   12/14/2021      CC/ADMIT DX:       Interval History:   Reviewed overnight events and nursing notes. She denies any medical issues. I have reviewed all labs/diagnostics from the last 24hrs. ROS:   I have done a 10 point ROS and all are negative, except what is mentioned in the HPI. ADULT DIET; Regular    Medications:      lamoTRIgine  50 mg Oral Daily    buPROPion  150 mg Oral BID    vitamin D  50,000 Units Oral Weekly    vitamin B-12  500 mcg Oral Daily    traZODone  50 mg Oral Nightly    influenza virus vaccine  0.5 mL IntraMUSCular Prior to discharge           Objective:   Vitals: /79   Pulse 76   Temp 97.5 °F (36.4 °C) (Tympanic)   Resp 16   Ht 5' 6\" (1.676 m)   Wt 259 lb (117.5 kg)   LMP 12/07/2021 (Approximate)   SpO2 100%   BMI 41.80 kg/m²  No intake or output data in the 24 hours ending 12/19/21 1258  General appearance: alert and cooperative with exam  Extremities: extremities normal, atraumatic, no cyanosis or edema  Neurologic:  No obvious focal neurologic deficits. Skin: no rashes    Assessment and Plan:   Principal Problem:    Severe episode of recurrent major depressive disorder, without psychotic features ([address]  Active Problems:    Depression with suicidal ideation    Suicidal ideation    Cannabis use disorder, severe, dependence ([address]    Borderline personality disorder ([address]  Resolved Problems:    * No resolved hospital problems. *    Vit D Def    Plan:  1. Continue present medication(s)   2. She is medically stable. I will monitor for any changes or concerns. 3.  Follow with Psych      Discharge planning:   her home     Reviewed treatment plans with the patient and/or family.              Electronically signed by Heaven Busby MD on 12/19/2021 at 12:58 PM

## 2021-12-19 NOTE — PROGRESS NOTES
Group Therapy Note    Start Time: 800  End Time:  510  Number of Participants: 11    Type of Group: Community Meeting       Patient's Goal:  Positive thinking      Notes:      Participation Level:  Active Listener       Participation Quality: Appropriate      Thought Process/Content: Logical      Affective Functioning: Congruent      Mood: calm      Level of consciousness:  Alert      Modes of Intervention: Support      Discipline Responsible: Behavioral Health Tech II      Signature:  Sandra Olivares

## 2021-12-19 NOTE — PROGRESS NOTES
Group Note  Group Time 1945 to 2015    Number of Participants in Group: 9  Number of Patients on Unit:9      Patient attended group:Yes  Reason for Absence:  Intervention for patient absence:        Type of Group:   Wrap-Up/Relaxation    Patient's Goal: See wrap up group sheet    Participation Level: Active Listener         Patient Response to Learning: appropriate    Patient's Behavior: Cooperative and Pleasant    Is Patient Social/Interacting: Yes    Relaxation:   Television:Yes   Reading:Yes   Game/Puzzle:Yes   Phone:  Yes             Please see patient's wrap up group sheet for patient's comments

## 2021-12-19 NOTE — PROGRESS NOTES
[facility] Adult Unit Daily Assessment  Nursing Progress Note    Room: [facility]/604-01   Name: Kiah Sharp   Age: 24 y.o. Gender: female   Dx: Severe episode of recurrent major depressive disorder, without psychotic features ([address]  Precautions: suicide risk  Inpatient Status: voluntary       SLEEP:    Sleep Quality Good  Sleep Medications:  trazodone  PRN Sleep Meds:        MEDICAL:    Other PRN Meds: No   Med Compliant: Yes  Accu-Chek: No  Oxygen/CPAP/BiPAP: No  CIWA/CINA: No   PAIN Assessment: none  Side Effects from medication: No    Is Patient experiencing any respiratory symptoms (headache, fever, body aches, cough. Dc Burns ): no  Patient educated by nursing to practice social distancing, wear masks, wash hands frequently: yes      PSYCH:    Depression: 3  Anxiety: 0   SI denies suicidal ideation   HI Negative for homicidal ideation      AVH:Absent      GENERAL:    Appetite: no change from normal    Social: yes  Speech: normal   Appearance: appropriately dressed, appropriately groomed and healthy looking    GROUP:    Group Participation: Yes  Participation Quality: Active Listener    Notes:     Patient is alert, oriented, calm and cooperative with staff and peers. Patient came out this morning for breakfast but did not eat stating that she does not eat breakfast. patient reports that she is getting discharged tomorrow and says she is ready for discharge. patient has good eye contact during interview. Patient has been interacting with peers in the day area today. Patient has a bright, congruent affect during interview. No obvious s/s of distress voiced or noted. Will continue to monitor.      Electronically signed by Juliana Garcia RN on 12/19/21 at 9:24 AM CST

## 2021-12-20 VITALS
BODY MASS INDEX: 41.62 KG/M2 | HEART RATE: 79 BPM | OXYGEN SATURATION: 96 % | WEIGHT: 259 LBS | DIASTOLIC BLOOD PRESSURE: 78 MMHG | RESPIRATION RATE: 16 BRPM | HEIGHT: 66 IN | TEMPERATURE: 96.8 F | SYSTOLIC BLOOD PRESSURE: 120 MMHG

## 2021-12-20 PROBLEM — F33.2 MAJOR DEPRESSIVE DISORDER, RECURRENT SEVERE WITHOUT PSYCHOTIC FEATURES (HCC): Status: ACTIVE | Noted: 2021-12-20

## 2021-12-20 PROCEDURE — 99239 HOSP IP/OBS DSCHRG MGMT >30: CPT | Performed by: PSYCHIATRY & NEUROLOGY

## 2021-12-20 PROCEDURE — 6370000000 HC RX 637 (ALT 250 FOR IP): Performed by: FAMILY MEDICINE

## 2021-12-20 PROCEDURE — 5130000000 HC BRIDGE APPOINTMENT

## 2021-12-20 PROCEDURE — 6370000000 HC RX 637 (ALT 250 FOR IP): Performed by: NURSE PRACTITIONER

## 2021-12-20 RX ORDER — LAMOTRIGINE 25 MG/1
50 TABLET ORAL DAILY
Qty: 60 TABLET | Refills: 1 | Status: ON HOLD | OUTPATIENT
Start: 2021-12-21 | End: 2022-10-24 | Stop reason: HOSPADM

## 2021-12-20 RX ORDER — TRAZODONE HYDROCHLORIDE 50 MG/1
50 TABLET ORAL NIGHTLY
Qty: 30 TABLET | Refills: 1 | Status: ON HOLD | OUTPATIENT
Start: 2021-12-20 | End: 2022-10-18

## 2021-12-20 RX ORDER — BUPROPION HYDROCHLORIDE 150 MG/1
150 TABLET, EXTENDED RELEASE ORAL 2 TIMES DAILY
Qty: 60 TABLET | Refills: 1 | Status: ON HOLD | OUTPATIENT
Start: 2021-12-20 | End: 2022-10-18

## 2021-12-20 RX ORDER — ERGOCALCIFEROL 1.25 MG/1
50000 CAPSULE ORAL WEEKLY
Qty: 11 CAPSULE | Refills: 0 | Status: ON HOLD | OUTPATIENT
Start: 2021-12-23 | End: 2022-10-24

## 2021-12-20 RX ADMIN — CYANOCOBALAMIN TAB 500 MCG 500 MCG: 500 TAB at 08:08

## 2021-12-20 RX ADMIN — BUPROPION HYDROCHLORIDE 150 MG: 150 TABLET, EXTENDED RELEASE ORAL at 08:08

## 2021-12-20 RX ADMIN — LAMOTRIGINE 50 MG: 25 TABLET ORAL at 08:07

## 2021-12-20 NOTE — PROGRESS NOTES
[facility] Daily Shift Assessment-Geriatric Unit  Nursing Progress Note          Room: [address] [place] [address]   Name: Mallory Chery   Age: 24 y.o. Gender: female   Dx: Severe episode of recurrent major depressive disorder, without psychotic features ([address]  Precautions: suicide risk  Inpatient Status: voluntary     SLEEP:    Sleep: Yes,   Sleep Quality Good   Hours Slept: 8   Sleep Medications: Yes trazodone 50 mg  PRN Sleep Meds: No       MEDICAL:      Other PRN Meds: No   Med Compliant: Yes   Accu-Chek: No   Oxygen/CPAP/BiPAP: No  CIWA/CINA: No   PAIN Assessment: none  Side Effects from medication: No    Is Patient experiencing any respiratory symptoms (headache, fever, body aches, cough. Timur Campbell ): no  Patient educated by nursing to practice social distancing, wear masks, wash hands frequently: yes      Metabolic Screening:    No results found for: LABA1C    No results found for: CHOL  No results found for: TRIG  No results found for: HDL  No components found for: LDLCAL  No results found for: LABVLDL      Body mass index is 41.8 kg/m². BP Readings from Last 2 Encounters:   12/19/21 110/72         PSYCH:     SI denies suicidal ideation    HI Negative for homicidal ideation        AVH:Absent      Depression: 3 Anxiety: 0       GENERAL:      Appetite: no change from normal    Social: Yes   Speech: normal   Appearance:appropriately dressed, appropriately groomed, good hygiene and healthy looking  Assistive Devices: none  Level of Assist: Independent      GROUP:    Group Participation: Yes  Participation LevelActive Listener    Participation QualityAppropriate    Notes: Pt in the dining area during this interview,her affect is bright,she is social with staff and peers. Pt reports she is excited to go home. Will continue to monitor.

## 2021-12-20 NOTE — PLAN OF CARE
Problem: Health Maintenance - Impaired:  Goal: Ability to perform activities of daily living will improve  Outcome: Ongoing     Group Therapy Note     Date: 12/20/2021  Start Time: 1000  End Time:  1030  Number of Participants: 13     Type of Group: Psychoeducation     Wellness Binder Information  Module Name:  emotional wellness  Session Number:  1     Patient's Goal:  validation of feelings     Notes:  pt acknowledged to have feelings validated it may be necessary to share feelings with others.      Status After Intervention:  Improved     Participation Level: Interactive     Participation Quality: Appropriate, Attentive, and Sharing        Speech:  normal        Thought Process/Content: Logical        Affective Functioning: Congruent        Mood: congruent        Level of consciousness:  Alert, Oriented x4, and Attentive        Response to Learning: Able to verbalize current knowledge/experience        Endings: None Reported     Modes of Intervention: Education        Discipline Responsible: Psychoeducational Specialist        Signature:  Julisa Durbin

## 2021-12-20 NOTE — PROGRESS NOTES
[facility]  Discharge Note  Bridge Appointment completed: Reviewed Discharge Instructions with patient. Patient verbalizes understanding and agreement with the discharge plan using the teachback method. Referral for Outpatient Tobacco Cessation Counseling, upon discharge (rene X if applicable and completed):    ( )  Hospital staff assisted patient to call Quit Line or faxed referral                                   during hospitalization                  ( )  Recognizing danger situations (included triggers and roadblocks), if not completed on admission                    ( )  Coping skills (new ways to manage stress, exercise, relaxation techniques, changing routine, distraction), if not completed on admission                                                           ( )  Basic information about quitting (benefits of quitting, techniques in how to quit, available resources, if not completed on admission  ( ) Referral for counseling faxed to [facility]   ( ) Patient refused referral  (x ) Patient refused counseling  ( ) Patient refused smoking cessation medication upon discharge    Vaccinations (rene X if applicable and completed):  ( ) Patient states already received influenza vaccine elsewhere  (x ) Patient received influenza vaccine during this hospitalization  ( ) Patient refused influenza vaccine at this time      Pt discharged with followings belongings:       Valuables sent home with all belongings. Valuables retrieved from [employer] and returned to patient. Patient left department with  staff via  ambulation, discharged to  home. Patient education on aftercare instructions: yes  Patient verbalize understanding of AVS:  yes. Suicidal Ideations? No AVH? no HI?  Negative for homicidal ideation       Status EXAM upon discharge:  Status and Exam  Normal: Yes  Facial Expression: Brightened  Affect: Appropriate  Level of Consciousness: Alert  Mood:Normal: Yes  Mood: Depressed (rates depression 3)  Motor Activity:Normal: Yes  Motor Activity: Decreased  Interview Behavior: Cooperative  Preception: [place] to Person,[place] to Time,[place] to Place,[place] to Situation  Attention:Normal: Yes  Attention: Distractible  Thought Processes: Other(See comment)  Thought Content:Normal: Yes  Thought Content: Preoccupations  Hallucinations: None  Delusions: No  Memory:Normal: Yes  Insight and Judgment: Yes  Insight and Judgment: Poor Judgment,Poor Insight  Present Suicidal Ideation: No  Present Homicidal Ideation: No      Patient discharged home in stable condition with no obvious s/s of distress voiced or noted. patient discharged home with belongings and home medications.
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
## 2021-12-20 NOTE — PROGRESS NOTES
Group Therapy Note    Start Time: 800  End Time:  186  Number of Participants: 15    Type of Group: Community Meeting       Patient's Goal:  \"Positive thinking\"      Notes:        Participation Level:  Active Listener       Participation Quality: Appropriate      Thought Process/Content: Logical      Affective Functioning: Congruent      Mood: Calm      Level of consciousness:  Alert      Modes of Intervention: Support      Discipline Responsible: Behavioral Health Tech II      Signature:  Susan Townsend

## 2021-12-20 NOTE — PROGRESS NOTES
Group Note    Number of Participants in Group: 12  Number of Patients on Unit:12      Patient attended group:Yes  Reason for Absence:  Intervention for patient absence:        Type of Group:   Wrap-Up/Relaxation    Patient's Goal: See wrap up group sheet    Participation Level:     Active           Patient Response to Learning: Yes    Patient's Behavior: Cooperative    Is Patient Social/Interacting: Yes    Relaxation:   Television:No   Reading:Yes   Game/Puzzle:Yes   Phone: No       Notes/Comments:      Please see patient's wrap up group sheet for patient's comments       Electronically signed by Sophia Gonzalez on 12/19/21 at 10:15 PM CST
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
## 2021-12-20 NOTE — PLAN OF CARE
Problem: Suicide risk  Goal: Provide patient with safe environment  Description: Provide patient with safe environment  Outcome: Completed     Problem: Discharge Planning:  Goal: Discharged to appropriate level of care  Description: Discharged to appropriate level of care  Outcome: Completed     Problem: Health Maintenance - Impaired:  Goal: Ability to perform activities of daily living will improve  Description: Ability to perform activities of daily living will improve  12/20/2021 1102 by Jhon Hill RN  Outcome: Completed  12/20/2021 1052 by Lord Manzo  Outcome: Ongoing  Goal: Able to sleep without medication for appropriate length of time  Description: Able to sleep without medication for appropriate length of time  Outcome: Completed  Goal: Maintenance of adequate nutrition will improve  Description: Maintenance of adequate nutrition will improve  Outcome: Completed     Problem: Mood - Altered:  Goal: Mood stable  Description: Mood stable  Outcome: Completed     Problem: Self-Esteem - Low:  Goal: Demonstrates positive self-esteem  Description: Demonstrates positive self-esteem  Outcome: Completed     Problem: Cerebrospinal Fluid Leakage - Risk Of:  Goal: Demonstration of organized thought processes  Description: Demonstration of organized thought processes  Outcome: Completed     Problem: Violence - Risk of, Self/Other-Directed:  Goal: Knowledge of developmental care interventions  Description: Absence of violence  Outcome: Completed     Problem: Altered Mood, Depressive Behavior:  Goal: Able to verbalize acceptance of life and situations over which he or she has no control  Description: Able to verbalize acceptance of life and situations over which he or she has no control  Outcome: Completed

## 2021-12-21 NOTE — PROGRESS NOTES
Discharge Note     Pt discharging on this date. Pt denies SI, HI, and AVH at this time. Pt reports improvement in behavior and is leaving unit in overall good condition. SW and pt discussed pt's follow up appointments and importance of attending appointments as scheduled, pt voiced understanding and agreement. Pt and SW also discussed pt safety plan and pt able to verbally identify: warning signs, coping strategies, places and people that help make the pt feel better/distract negative thoughts, friends/family/agencies/professionals the pt can reach out to in a crisis, and something that is important to the pt/worth living for. Pt provided the national suicide prevention hotline number (1-157.331.5614) as well as local community behavioral health [facility]) crisis number for emergencies (1-735.844.7659). Pt to follow up with:  [address]) on __/__/21 @ 00:00 AM/PM. Patient will follow up with Dr. Marilyn Salmeron at [facility] for medication management, patient will be seen on __/__/21 @ 00:00 AM/PM for the med management appt.      Referral to out patient tobacco cessation counseling treatment:  Made at discharge with (company) on (date) at (time)  Patient refused referral to outpatient tobacco cessation counseling    SW offered to assist pt with transportation, pt reports

## 2021-12-21 NOTE — PROGRESS NOTES
Progress Note  Maria De Jesus Pappas  12/20/2021 10:33 PM  Subjective:   Admit Date:   12/14/2021      CC/ADMIT DX:       Interval History:   Reviewed overnight events and nursing notes. She has no new physical complaints. I have reviewed all labs/diagnostics from the last 24hrs. ROS:   I have done a 10 point ROS and all are negative, except what is mentioned in the HPI. No diet orders on file    Medications:             Objective:   Vitals: /78   Pulse 79   Temp 96.8 °F (36 °C) (Temporal)   Resp 16   Ht 5' 6\" (1.676 m)   Wt 259 lb (117.5 kg)   LMP 12/07/2021 (Approximate)   SpO2 96%   BMI 41.80 kg/m²  No intake or output data in the 24 hours ending 12/20/21 2233  General appearance: alert and cooperative with exam  Extremities: extremities normal, atraumatic, no cyanosis or edema  Neurologic:  No obvious focal neurologic deficits. Skin: no rashes    Assessment and Plan:   Principal Problem:    Severe episode of recurrent major depressive disorder, without psychotic features ([address]  Active Problems:    Depression with suicidal ideation    Suicidal ideation    Cannabis use disorder, severe, dependence ([address]    Borderline personality disorder ([address]    Major depressive disorder, recurrent severe without psychotic features ([address]  Resolved Problems:    * No resolved hospital problems. *    Vit D Def    Plan:  1. Continue present medication(s)   2. She remains medically stable. I will monitor for any changes or concerns. 3.  Follow with Psych      Discharge planning:   her home     Reviewed treatment plans with the patient and/or family.              Electronically signed by Renetta Smith MD on 12/20/2021 at 10:33 PM

## 2022-10-17 ENCOUNTER — HOSPITAL ENCOUNTER (INPATIENT)
Age: 22
LOS: 7 days | Discharge: HOME | DRG: 885 | End: 2022-10-24
Attending: PSYCHIATRY & NEUROLOGY | Admitting: PSYCHIATRY & NEUROLOGY
Payer: MEDICAID

## 2022-10-17 DIAGNOSIS — R45.851 DEPRESSION WITH SUICIDAL IDEATION: Primary | ICD-10-CM

## 2022-10-17 DIAGNOSIS — F32.A DEPRESSION WITH SUICIDAL IDEATION: Primary | ICD-10-CM

## 2022-10-17 LAB
ALBUMIN SERPL-MCNC: 4.1 G/DL (ref 3.5–5.2)
ALP BLD-CCNC: 70 U/L (ref 35–104)
ALT SERPL-CCNC: 7 U/L (ref 5–33)
AMPHETAMINE SCREEN, URINE: NEGATIVE
ANION GAP SERPL CALCULATED.3IONS-SCNC: 11 MMOL/L (ref 7–19)
AST SERPL-CCNC: 12 U/L (ref 5–32)
BARBITURATE SCREEN URINE: NEGATIVE
BASOPHILS ABSOLUTE: 0 K/UL (ref 0–0.2)
BASOPHILS RELATIVE PERCENT: 0.6 % (ref 0–1)
BENZODIAZEPINE SCREEN, URINE: NEGATIVE
BILIRUB SERPL-MCNC: <0.2 MG/DL (ref 0.2–1.2)
BUN BLDV-MCNC: 8 MG/DL (ref 6–20)
BUPRENORPHINE URINE: NEGATIVE
CALCIUM SERPL-MCNC: 9.1 MG/DL (ref 8.6–10)
CANNABINOID SCREEN URINE: POSITIVE
CHLORIDE BLD-SCNC: 98 MMOL/L (ref 98–111)
CO2: 26 MMOL/L (ref 22–29)
COCAINE METABOLITE SCREEN URINE: NEGATIVE
CREAT SERPL-MCNC: 0.7 MG/DL (ref 0.5–0.9)
EOSINOPHILS ABSOLUTE: 0.2 K/UL (ref 0–0.6)
EOSINOPHILS RELATIVE PERCENT: 2.6 % (ref 0–5)
ETHANOL: 12 MG/DL (ref 0–0.08)
GFR SERPL CREATININE-BSD FRML MDRD: >60 ML/MIN/{1.73_M2}
GLUCOSE BLD-MCNC: 96 MG/DL (ref 74–109)
HCG QUALITATIVE: NEGATIVE
HCT VFR BLD CALC: 41.8 % (ref 37–47)
HEMOGLOBIN: 13.5 G/DL (ref 12–16)
IMMATURE GRANULOCYTES #: 0 K/UL
LYMPHOCYTES ABSOLUTE: 2.1 K/UL (ref 1.1–4.5)
LYMPHOCYTES RELATIVE PERCENT: 33.5 % (ref 20–40)
Lab: ABNORMAL
MCH RBC QN AUTO: 29.3 PG (ref 27–31)
MCHC RBC AUTO-ENTMCNC: 32.3 G/DL (ref 33–37)
MCV RBC AUTO: 90.7 FL (ref 81–99)
METHADONE SCREEN, URINE: NEGATIVE
METHAMPHETAMINE, URINE: NEGATIVE
MONOCYTES ABSOLUTE: 0.3 K/UL (ref 0–0.9)
MONOCYTES RELATIVE PERCENT: 5.4 % (ref 0–10)
NEUTROPHILS ABSOLUTE: 3.6 K/UL (ref 1.5–7.5)
NEUTROPHILS RELATIVE PERCENT: 57.7 % (ref 50–65)
OPIATE SCREEN URINE: NEGATIVE
OXYCODONE URINE: NEGATIVE
PDW BLD-RTO: 13.2 % (ref 11.5–14.5)
PHENCYCLIDINE SCREEN URINE: NEGATIVE
PLATELET # BLD: 222 K/UL (ref 130–400)
PMV BLD AUTO: 11.6 FL (ref 9.4–12.3)
POTASSIUM SERPL-SCNC: 4.1 MMOL/L (ref 3.5–5)
PROPOXYPHENE SCREEN: NEGATIVE
RBC # BLD: 4.61 M/UL (ref 4.2–5.4)
SARS-COV-2, NAAT: NOT DETECTED
SODIUM BLD-SCNC: 135 MMOL/L (ref 136–145)
TOTAL PROTEIN: 7.2 G/DL (ref 6.6–8.7)
TRICYCLIC, URINE: NEGATIVE
WBC # BLD: 6.2 K/UL (ref 4.8–10.8)

## 2022-10-17 PROCEDURE — 1240000000 HC EMOTIONAL WELLNESS R&B

## 2022-10-17 PROCEDURE — 85025 COMPLETE CBC W/AUTO DIFF WBC: CPT

## 2022-10-17 PROCEDURE — 80053 COMPREHEN METABOLIC PANEL: CPT

## 2022-10-17 PROCEDURE — 36415 COLL VENOUS BLD VENIPUNCTURE: CPT

## 2022-10-17 PROCEDURE — 99285 EMERGENCY DEPT VISIT HI MDM: CPT

## 2022-10-17 PROCEDURE — 82077 ASSAY SPEC XCP UR&BREATH IA: CPT

## 2022-10-17 PROCEDURE — 80306 DRUG TEST PRSMV INSTRMNT: CPT

## 2022-10-17 PROCEDURE — 87635 SARS-COV-2 COVID-19 AMP PRB: CPT

## 2022-10-17 PROCEDURE — 84703 CHORIONIC GONADOTROPIN ASSAY: CPT

## 2022-10-17 RX ORDER — DICYCLOMINE HCL 20 MG
20 TABLET ORAL ONCE
Status: ON HOLD | COMMUNITY
End: 2022-10-24 | Stop reason: HOSPADM

## 2022-10-17 RX ORDER — ACETAMINOPHEN 325 MG/1
650 TABLET ORAL EVERY 4 HOURS PRN
Status: DISCONTINUED | OUTPATIENT
Start: 2022-10-17 | End: 2022-10-18

## 2022-10-17 RX ORDER — TRAZODONE HYDROCHLORIDE 50 MG/1
50 TABLET ORAL NIGHTLY PRN
Status: DISCONTINUED | OUTPATIENT
Start: 2022-10-18 | End: 2022-10-18

## 2022-10-17 RX ORDER — QUETIAPINE 150 MG/1
150 TABLET, FILM COATED, EXTENDED RELEASE ORAL DAILY
Status: ON HOLD | COMMUNITY
End: 2022-10-18

## 2022-10-17 RX ORDER — OMEPRAZOLE 20 MG/1
40 CAPSULE, DELAYED RELEASE ORAL DAILY
COMMUNITY

## 2022-10-17 RX ORDER — HYDROXYZINE HYDROCHLORIDE 25 MG/1
25 TABLET, FILM COATED ORAL 3 TIMES DAILY PRN
Status: DISCONTINUED | OUTPATIENT
Start: 2022-10-17 | End: 2022-10-24 | Stop reason: HOSPADM

## 2022-10-17 RX ORDER — POLYETHYLENE GLYCOL 3350 17 G/17G
17 POWDER, FOR SOLUTION ORAL DAILY PRN
Status: DISCONTINUED | OUTPATIENT
Start: 2022-10-17 | End: 2022-10-24 | Stop reason: HOSPADM

## 2022-10-17 ASSESSMENT — LIFESTYLE VARIABLES
HOW MANY STANDARD DRINKS CONTAINING ALCOHOL DO YOU HAVE ON A TYPICAL DAY: PATIENT DOES NOT DRINK
HOW OFTEN DO YOU HAVE A DRINK CONTAINING ALCOHOL: 2-3 TIMES A WEEK

## 2022-10-18 PROCEDURE — 1240000000 HC EMOTIONAL WELLNESS R&B

## 2022-10-18 PROCEDURE — 6370000000 HC RX 637 (ALT 250 FOR IP): Performed by: PSYCHIATRY & NEUROLOGY

## 2022-10-18 PROCEDURE — 99223 1ST HOSP IP/OBS HIGH 75: CPT | Performed by: PSYCHIATRY & NEUROLOGY

## 2022-10-18 RX ORDER — DULOXETIN HYDROCHLORIDE 60 MG/1
60 CAPSULE, DELAYED RELEASE ORAL DAILY
Status: DISCONTINUED | OUTPATIENT
Start: 2022-10-21 | End: 2022-10-24 | Stop reason: HOSPADM

## 2022-10-18 RX ORDER — DULOXETIN HYDROCHLORIDE 30 MG/1
30 CAPSULE, DELAYED RELEASE ORAL DAILY
Status: COMPLETED | OUTPATIENT
Start: 2022-10-18 | End: 2022-10-20

## 2022-10-18 RX ORDER — ACETAMINOPHEN 325 MG/1
650 TABLET ORAL EVERY 4 HOURS PRN
Status: DISCONTINUED | OUTPATIENT
Start: 2022-10-18 | End: 2022-10-24 | Stop reason: HOSPADM

## 2022-10-18 RX ORDER — DOXEPIN HYDROCHLORIDE 10 MG/1
10 CAPSULE ORAL NIGHTLY
Status: DISCONTINUED | OUTPATIENT
Start: 2022-10-18 | End: 2022-10-19

## 2022-10-18 RX ORDER — BUPROPION HYDROCHLORIDE 100 MG/1
100 TABLET, EXTENDED RELEASE ORAL DAILY
Status: COMPLETED | OUTPATIENT
Start: 2022-10-21 | End: 2022-10-23

## 2022-10-18 RX ORDER — BUPROPION HYDROCHLORIDE 100 MG/1
200 TABLET, EXTENDED RELEASE ORAL DAILY
Status: COMPLETED | OUTPATIENT
Start: 2022-10-18 | End: 2022-10-20

## 2022-10-18 RX ORDER — PANTOPRAZOLE SODIUM 40 MG/1
40 TABLET, DELAYED RELEASE ORAL
Status: DISCONTINUED | OUTPATIENT
Start: 2022-10-19 | End: 2022-10-18

## 2022-10-18 RX ORDER — PANTOPRAZOLE SODIUM 40 MG/1
40 TABLET, DELAYED RELEASE ORAL
Status: DISCONTINUED | OUTPATIENT
Start: 2022-10-18 | End: 2022-10-24 | Stop reason: HOSPADM

## 2022-10-18 RX ORDER — DICYCLOMINE HCL 20 MG
20 TABLET ORAL DAILY
Status: DISCONTINUED | OUTPATIENT
Start: 2022-10-18 | End: 2022-10-24 | Stop reason: HOSPADM

## 2022-10-18 RX ADMIN — DULOXETINE 30 MG: 30 CAPSULE, DELAYED RELEASE ORAL at 10:57

## 2022-10-18 RX ADMIN — LAMOTRIGINE 150 MG: 100 TABLET ORAL at 20:54

## 2022-10-18 RX ADMIN — HYDROXYZINE HYDROCHLORIDE 25 MG: 25 TABLET, FILM COATED ORAL at 00:38

## 2022-10-18 RX ADMIN — PANTOPRAZOLE SODIUM 40 MG: 40 TABLET, DELAYED RELEASE ORAL at 10:58

## 2022-10-18 RX ADMIN — HYDROXYZINE HYDROCHLORIDE 25 MG: 25 TABLET, FILM COATED ORAL at 20:54

## 2022-10-18 RX ADMIN — LAMOTRIGINE 150 MG: 100 TABLET ORAL at 10:58

## 2022-10-18 RX ADMIN — DICYCLOMINE HYDROCHLORIDE 20 MG: 20 TABLET ORAL at 10:57

## 2022-10-18 RX ADMIN — DOXEPIN HYDROCHLORIDE 10 MG: 10 CAPSULE ORAL at 20:54

## 2022-10-18 RX ADMIN — TRAZODONE HYDROCHLORIDE 50 MG: 50 TABLET ORAL at 00:38

## 2022-10-18 RX ADMIN — BUPROPION HYDROCHLORIDE 200 MG: 100 TABLET, FILM COATED, EXTENDED RELEASE ORAL at 11:01

## 2022-10-18 ASSESSMENT — PAIN DESCRIPTION - DESCRIPTORS: DESCRIPTORS: CRAMPING

## 2022-10-18 ASSESSMENT — SLEEP AND FATIGUE QUESTIONNAIRES
DO YOU USE A SLEEP AID: YES
DO YOU HAVE DIFFICULTY SLEEPING: NO
SLEEP PATTERN: DIFFICULTY FALLING ASLEEP
AVERAGE NUMBER OF SLEEP HOURS: 13

## 2022-10-18 ASSESSMENT — PATIENT HEALTH QUESTIONNAIRE - PHQ9: SUM OF ALL RESPONSES TO PHQ QUESTIONS 1-9: 27

## 2022-10-18 ASSESSMENT — PAIN - FUNCTIONAL ASSESSMENT: PAIN_FUNCTIONAL_ASSESSMENT: ACTIVITIES ARE NOT PREVENTED

## 2022-10-18 ASSESSMENT — PAIN DESCRIPTION - ORIENTATION: ORIENTATION: MID

## 2022-10-18 ASSESSMENT — PAIN DESCRIPTION - LOCATION: LOCATION: ABDOMEN

## 2022-10-18 ASSESSMENT — PAIN SCALES - GENERAL: PAINLEVEL_OUTOF10: 6

## 2022-10-18 NOTE — PROGRESS NOTES
[facility] Adult Unit Daily Assessment  Nursing Progress Note    Room: [facility]-01   Name: Venkat Mention   Age: 25 y.o. Gender: female   Dx: Depression with suicidal ideation  Precautions: suicide risk  Inpatient Status: voluntary       SLEEP:  Sleep Quality Very poor  Sleep Medications: No   PRN Sleep Meds: No       MEDICAL:  Other PRN Meds: No   Med Compliant: Yes  Accu-Chek: No  Oxygen/CPAP/BiPAP: No  CIWA/CINA: No   PAIN Assessment: present - adequately treated  Side Effects from medication: No      Metabolic Screening:  No results found for: LABA1C  No results found for: CHOL  No results found for: TRIG  No results found for: HDL  No components found for: LDLCAL  No results found for: LABVLDL  Body mass index is 41.48 kg/m². BP Readings from Last 2 Encounters:   10/18/22 106/79   12/20/21 120/78         Medical Bed:   Is patient in a medical bed? no   If medical bed is in use, has nursing secured room while patient is awake and out of the room? NA  Has safety checks by nursing been completed on the bed/room this shift? NA    Protective Factors:  Patient identifies protective factors with nursing staff as follows: Identifies reasons for living: Yes   Supportive Social Network or family: Yes    Belief that suicide is immoral/high spirituality: No   Responsibility to family or others/living with family: Yes   Fear of death or dying due to pain and suffering: No   Engaged in work or school: Yes     If Patient is unable to identify, reason why? PSYCH:  Depression: 9   Anxiety: 3   SI active and without plan      HI Negative for homicidal ideation      AVH:no If Hallucinations are present, describe?          GENERAL:  Appetite: poor   Percent Meals: 25%   Social: No   Speech: normal   Appearance: appropriately dressed, appropriately groomed, good hygiene, looks younger, and healthy looking    GROUP:  Group Participation: Yes  Participation Quality: Active Listener and Interactive    Notes: Patient resting in her bed with a shirt draped over her eyes. Patient seems friendly and pleasant during interview. Said that she was sobbing before she was given some medicine that she said made her feel better. Patient said that she has been depressed since she was 13 but did not start medication until she was in her late teens. Patient rated her depression as a 9 and her anxiety as a 3. Patient says she is positive for SI but has no plan and contracts for safety. Patient denies HI and AVH. Patient is complaining of stomach cramps. Patient states she believes she may have IBS and wonders if there is anyway that she could get a diagnosis of this while she is on this floor. Will continue to monitor for safety.         Electronically signed by Kermit Mckeon RN on 10/18/22 at 5:26 PM CDT

## 2022-10-18 NOTE — PROGRESS NOTES
Behavioral Services  Medicare Certification Upon Admission    I certify that this patient's inpatient psychiatric hospital admission is medically necessary for:    [x] (1) Treatment which could reasonably be expected to improve this patient's condition,       [x] (2) Or for diagnostic study;     AND     [x](2) The inpatient psychiatric services are provided while the individual is under the care of a physician and are included in the individualized plan of care.     Estimated length of stay/service 5-7 days    Plan for post-hospital care TBD    Electronically signed by Jerrica Rodriguez MD on 10/18/2022 at 11:20 AM
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
## 2022-10-18 NOTE — PROGRESS NOTES
Group Therapy Note    Start Time: 322  End Time:  800  Number of Participants: 9    Type of Group: Community Meeting       Patient's Goal:  \"Sleep\"      Notes:        Participation Level:  Active Listener       Participation Quality: Appropriate      Thought Process/Content: Logical      Affective Functioning: Congruent      Mood:  Calm      Level of consciousness:  Alert      Modes of Intervention: Support      Discipline Responsible: Behavioral Health Tech II      Signature:  Susan Schneider

## 2022-10-18 NOTE — PLAN OF CARE
Group Therapy Note     Date: 10/18/2022  Start Time: 3018  End Time:  1600  Number of Participants: 6     Type of Group: recovery     Wellness Binder Information  Module Name:  emotional wellness  Session Number:  5     Patient's Goal:  obstacles to emotional wellness     Notes:  pt was verbally prompted to attend group. Pt refused. Information about obstacles to wellness was provided. Status After Intervention:       Participation Level:      Participation Quality:         Speech:           Thought Process/Content:         Affective Functioning:         Mood:         Level of consciousness:          Response to Learning:         Endings:      Modes of Intervention:         Discipline Responsible: Psychoeducational Specialist        Signature:  Ryan Kaur

## 2022-10-18 NOTE — PROGRESS NOTES
Admission Note      Reason for admission/Target Symptom: Per nursing admission assessment - Reason for Admission: Per ER provider note -Per ER provider admit note -Marda Osler a 25 y.o. female who presents to the emergency department for evaluation of depression and suicidal thoughts. Pt tells me that she has history of depression followed by he pcp. She relates that she has had thoughts of suicide recently discussing this today with her therapist. She tells me that she has reoccurring thoughts of \"just dying\". She does not endorse specific plan for suicide to me. She tells me that she has been sleeping more than usual. She relates that she has had recent treatment for sinus infection with overall improvement. She does not endorse hallucinations or homicidal thoughts. She admits to smoking marijuana, drinks liquor on weekends and tells me that she lives with roommate, attends college studying to be an . Diagnoses: Depression NOS  UDS: Cannabinoid  BAL:  15    SW will meet with treatment team to discuss patient's treatment including care planning, discharge planning, and follow-up needs. Patient has been admitted to [facility]. Treatment team will identify the patient's discharge needs. Appointments will be made for medication management and outpatient therapy/counseling. Pt confirmed the need for ongoing treatment post inpatient stay. Pt was also provided a handout of contact information for drug and alcohol treatment centers and other community support service such as [facility], AA, and [facility].
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
## 2022-10-18 NOTE — PROGRESS NOTES
Treatment Team Note:    Target Symptoms/Reason for admission: Per nursing admission assessment - Reason for Admission: Per ER provider note -Per ER provider admit note -Alisha Bartlett a 25 y.o. female who presents to the emergency department for evaluation of depression and suicidal thoughts. Pt tells me that she has history of depression followed by he pcp. She relates that she has had thoughts of suicide recently discussing this today with her therapist. She tells me that she has reoccurring thoughts of \"just dying\". She does not endorse specific plan for suicide to me. She tells me that she has been sleeping more than usual. She relates that she has had recent treatment for sinus infection with overall improvement. She does not endorse hallucinations or homicidal thoughts. She admits to smoking marijuana, drinks liquor on weekends and tells me that she lives with roommate, attends college studying to be an . Diagnoses per psych provider: Depression with suicidal ideation [F32.  Bump    Therapist met with treatment team to discuss patients treatment and discharge plans.     Patient's aftercare plan is: SW will meet with patient to gather information    Aftercare appointments made: No - SW will make discharge appointments    Pt lives with: roommates    Collateral obtained from: roommates  Collateral obtained on:10/18/22    Attending groups: yes    Behavior: calm    Has patient been completing ADL's:  yes    SI:  patient denies SI  Plan: no   If yes describe: N/A - patient denies plan  HI:  patient denies HI  If present describe: N/A  Delusions: patient denies delusions  If present describe: N/A  Hallucinations: patient denies hallucinations  If present describe: N/A    Patient rates their -- Depression: 1-10:  0  Anxiety:1-10:  0    Sleeping:yes    Taking medication: yes    Misc:

## 2022-10-18 NOTE — ED TRIAGE NOTES
Pt here for si/depression recent changes in medications increased doses and also having issues with family.

## 2022-10-18 NOTE — PROGRESS NOTES
SW met with patient to complete psychosocial and lifetime CSSR-S on this date. Patients long and short-term goals discussed. Patient voiced understanding. Treatment plan sheet signed. Patient verbalized understanding of the treatment plan. Patient participated in goals and objectives of the treatment plan. Patient discussed safety plan with . SW explained treatment goals with pt. Decreasing depression and anxiety by improvement of positive coping patterns was discussed. Pt acknowledged understanding of treatment goals and signed treatment plan signature sheet. In the last 6 months has the patient been a danger to self: Yes  In the last 6 months has the patient been a danger to others: No  Legal Guardian/POA: No    Provided patient with [employer] handout entitled \"Quitting Smoking. \"  Reviewed handout with patient: addressing dangers of smoking, developing coping skills, and providing basic information about quitting. Patient received all components practical counseling of tobacco practical counseling during the hospital stay.

## 2022-10-18 NOTE — PROGRESS NOTES
Collateral obtained from: patients roommate and best friend [phone]    Immediate Stressors & Time Episode Began: patient doctor gave her a new medication she started to have thoughts of harming herself and she has been on the medication for 3 weeks. Patient was taking to her therapist in [facility]    Diagnosis/Hx of compliance with meds: Patient has been diagnosed with borderline disorder and ptsd. Tx Hx/Past hospitalizations:  caller reports previous inpatient treatment history --- history includes previous admissions to [facility]    Family hx of psychiatric issues: caller reports family history of psychiatric issues -- history includes patients mom has schizophrenia and her father has attempted suicide    Substance Abuse: caller reports substance abuse history -- history includes alchol socially and uses mariajuana     Pending Legal: caller reports no pending legal issues    Safety Issues (Weapons? Hx of attempts): The importance of locking weapons in a secured location was explained and recommended to collateral caller. Weapons are locked up but she doesn't have access. Support system/Medication Managed by: The importance of medication management and locking extra medication in a secured location was explained and reccommended to collateral caller.      Discharge Disposition: home -lives with friend    Additional Info:

## 2022-10-18 NOTE — PLAN OF CARE
Problem: Self Harm/Suicidality  Goal: Will have no self-injury during hospital stay  Description: INTERVENTIONS:  1. Q 30 MINUTES: Routine safety checks  2. Q SHIFT & PRN: Assess risk to determine if routine checks are adequate to maintain patient safety  Outcome: Progressing     Problem: Depression  Goal: Will be euthymic at discharge  Description: INTERVENTIONS:  1. Administer medication as ordered  2. Provide emotional support via 1:1 interaction with staff  3. Encourage involvement in milieu/groups/activities  4. Monitor for social isolation  Outcome: Progressing     Problem: Anxiety  Goal: Will report anxiety at manageable levels  Description: INTERVENTIONS:  1. Administer medication as ordered  2. Teach and rehearse alternative coping skills  3. Provide emotional support with 1:1 interaction with staff  10/18/2022 1746 by Kermit Mckeon RN  Outcome: Progressing  10/18/2022 1153 by Duyen Gates  Outcome: Progressing     Problem: Drug Abuse/Detox  Goal: Will have no detox symptoms and will verbalize plan for changing drug-related behavior  Description: INTERVENTIONS:  1. Administer medication as ordered  2. Monitor physical status  3. Provide emotional support with 1:1 interaction with staff  4. Encourage  recovery focused treatment   Outcome: Progressing     Problem: Sleep Disturbance  Goal: Will exhibit normal sleeping pattern  Description: INTERVENTIONS:  1. Administer medication as ordered  2. Decrease environmental stimuli, including noise, as appropriate  3.  Discourage social isolation and naps during the day  Outcome: Progressing

## 2022-10-18 NOTE — PLAN OF CARE
Problem: Anxiety  Goal: Will report anxiety at manageable levels  Outcome: Progressing      Group Therapy Note     Date: 10/18/2022  Start Time: 1100  End Time:  0883  Number of Participants: 8     Type of Group: Psychoeducation     Wellness Binder Information  Module Name:  emotional wellness  Session Number:  1     Patient's Goal:  validation of feelings     Notes:  pt acknowledged to have feelings validated it may be necessary to share feelings with others.      Status After Intervention:  Improved     Participation Level: Interactive     Participation Quality: Appropriate, Attentive, and Sharing        Speech:  normal        Thought Process/Content: Logical        Affective Functioning: Congruent        Mood: congruent        Level of consciousness:  Alert, Oriented x4, and Attentive        Response to Learning: Able to verbalize current knowledge/experience        Endings: None Reported     Modes of Intervention: Education        Discipline Responsible: Psychoeducational Specialist        Signature:  Edd Cifuentes

## 2022-10-18 NOTE — ED PROVIDER NOTES
[facility] 6 ADULT [facility]  eMERGENCY dEPARTMENT eNCOUnter      Pt Name: Humera Hernandez  MRN: [id number]  Lexagfshirley 2000  Date of evaluation: 10/17/2022  Provider: ESTER Diggs [id number]       Chief Complaint   Patient presents with    Suicidal    Depression     Strong SI thoughts per patient. HISTORY OF PRESENT ILLNESS   (Location/Symptom, Timing/Onset,Context/Setting, Quality, Duration, Modifying Factors, Severity)  Note limiting factors. Edgar Ring a 25 y.o. female who presents to the emergency department for evaluation of depression and suicidal thoughts. Pt tells me that she has history of depression followed by he pcp. She relates that she has had thoughts of suicide recently discussing this today with her therapist. She tells me that she has reoccurring thoughts of \"just dying\". She does not endorse specific plan for suicide to me. She tells me that she has been sleeping more than usual. She relates that she has had recent treatment for sinus infection with overall improvement. She does not endorse hallucinations or homicidal thoughts. She admits to smoking marijuana, drinks liquor on weekends and tells me that she lives with roommate, attends college studying to be an . HPI    Nursing Notes were reviewed. REVIEW OF SYSTEMS    (2-9 systems for level 4, 10 or more for level 5)     Review of Systems   Psychiatric/Behavioral:  Positive for dysphoric mood, sleep disturbance and suicidal ideas. All other systems reviewed and are negative. A complete review of systems was performed and is negative except as noted above in the HPI.        PAST MEDICAL HISTORY     Past Medical History:   Diagnosis Date    Depression     PCOS (polycystic ovarian syndrome)          SURGICAL HISTORY       Past Surgical History:   Procedure Laterality Date    CYST REMOVAL      TONSILLECTOMY           CURRENT MEDICATIONS       Current Discharge Medication List        CONTINUE these medications which have NOT CHANGED    Details   QUEtiapine (SEROQUEL XR) 150 MG TB24 extended release tablet Take 150 mg by mouth daily      dicyclomine (BENTYL) 20 MG tablet Take 20 mg by mouth once      omeprazole (PRILOSEC) 20 MG delayed release capsule Take 40 mg by mouth daily      buPROPion (WELLBUTRIN SR) 150 MG extended release tablet Take 1 tablet by mouth 2 times daily  Qty: 60 tablet, Refills: 1      traZODone (DESYREL) 50 MG tablet Take 1 tablet by mouth nightly  Qty: 30 tablet, Refills: 1      vitamin B-12 500 MCG tablet Take 1 tablet by mouth daily  Qty: 30 tablet, Refills: 2      vitamin D (ERGOCALCIFEROL) 1.25 MG (20379 UT) CAPS capsule Take 1 capsule by mouth once a week for 11 doses  Qty: 11 capsule, Refills: 0      lamoTRIgine (LAMICTAL) 25 MG tablet Take 2 tablets by mouth daily  Qty: 60 tablet, Refills: 1             ALLERGIES     Milk-related compounds    FAMILY HISTORY     No family history on file. SOCIAL HISTORY       Social History     Socioeconomic History    Marital status: Single   Tobacco Use    Smoking status: Never    Smokeless tobacco: Never   Vaping Use    Vaping Use: Never used   Substance and Sexual Activity    Alcohol use: Never    Drug use: Yes     Types: Marijuana (Weed)       SCREENINGS    [place] Coma Scale  Eye Opening: Spontaneous  Best Verbal Response: Oriented  Best Motor Response: Obeys commands  [place] Coma Scale Score: 15        PHYSICAL EXAM    (up to 7 for level 4, 8 or more for level 5)     ED Triage Vitals   BP Temp Temp src Pulse Resp SpO2 Height Weight   -- -- -- -- -- -- -- --     Vitals:    10/17/22 2120 10/17/22 2257 10/17/22 2330 10/17/22 2339   BP: (!) 134/91 103/71  128/88   Pulse: 93 (!) 112 71 70   Resp: 16 16  18   Temp: 97.4 °F (36.3 °C) 97.3 °F (36.3 °C)  98.2 °F (36.8 °C)   TempSrc:    Temporal   SpO2: 96% 97%  96%   Weight:    257 lb 0.2 oz (116.6 kg)   Height:    5' 6\" (1.676 m)         Physical Exam  Vitals reviewed.    Cardiovascular: Rate and Rhythm: Normal rate and regular rhythm. Pulmonary:      Effort: Pulmonary effort is normal.   Musculoskeletal:      Cervical back: Neck supple. Neurological:      Mental Status: She is alert. DIAGNOSTIC RESULTS     EKG: All EKG's are interpreted by the Emergency Department Physician who either signs or Co-signs this chart in the absence of acardiologist.        RADIOLOGY:   Non-plain film images such as CT, Ultrasound andMRI are read by the radiologist. Plain radiographic images are visualized and preliminarily interpreted by the emergency physician with the below findings:        Interpretation per the Radiologist below, if available at the time of this note:    No orders to display         ED BEDSIDE ULTRASOUND:   Performed by ED Physician - none    LABS:  Labs Reviewed   CBC WITH AUTO DIFFERENTIAL - Abnormal; Notable for the following components:       Result Value    MCHC 32.3 (*)     All other components within normal limits   COMPREHENSIVE METABOLIC PANEL - Abnormal; Notable for the following components:    Sodium 135 (*)     All other components within normal limits   DRUG SCRN, BUPRENORPHINE - Abnormal; Notable for the following components:    Cannabinoid Scrn, Ur POSITIVE (*)     All other components within normal limits   COVID-19, RAPID   ETHANOL   HCG, SERUM, QUALITATIVE       All other labs were within normal range or not returned as of this dictation. RE-ASSESSMENT     Discussed on call psychiatrist Dr. Maikel Sevilla who accepts patient for admission to adult behavioral health unit. Pt is medically clear for admission.       EMERGENCY DEPARTMENT COURSE and DIFFERENTIALDIAGNOSIS/MDM:   Vitals:    Vitals:    10/17/22 2120 10/17/22 2257 10/17/22 2330 10/17/22 2339   BP: (!) 134/91 103/71  128/88   Pulse: 93 (!) 112 71 70   Resp: 16 16  18   Temp: 97.4 °F (36.3 °C) 97.3 °F (36.3 °C)  98.2 °F (36.8 °C)   TempSrc:    Temporal   SpO2: 96% 97%  96%   Weight:    257 lb 0.2 oz (116.6 kg)   Height: 5' 6\" (1.676 m)       MDM        CONSULTS:  IP CONSULT TO FAMILY MEDICINE    PROCEDURES:  Unless otherwise notedbelow, none     Procedures    FINAL IMPRESSION     1. Depression with suicidal ideation          DISPOSITION/PLAN   DISPOSITION Decision To Admit 10/17/2022 10:53:35 PM      PATIENT REFERRED TO:  No follow-up provider specified.     DISCHARGE MEDICATIONS:       Current Discharge Medication List          (Pleasenote that portions of this note were completed with a voice recognition program.  Efforts were made to edit the dictations but occasionally words are mis-transcribed.)               Anabell Castro, ESTER - FERMIN  10/18/22 0045 English

## 2022-10-19 LAB
TSH REFLEX FT4: 4.08 UIU/ML (ref 0.35–5.5)
VITAMIN B-12: 533 PG/ML (ref 211–946)
VITAMIN D 25-HYDROXY: 14.7 NG/ML

## 2022-10-19 PROCEDURE — 82306 VITAMIN D 25 HYDROXY: CPT

## 2022-10-19 PROCEDURE — 6370000000 HC RX 637 (ALT 250 FOR IP): Performed by: PSYCHIATRY & NEUROLOGY

## 2022-10-19 PROCEDURE — 99233 SBSQ HOSP IP/OBS HIGH 50: CPT | Performed by: PSYCHIATRY & NEUROLOGY

## 2022-10-19 PROCEDURE — 36415 COLL VENOUS BLD VENIPUNCTURE: CPT

## 2022-10-19 PROCEDURE — 1240000000 HC EMOTIONAL WELLNESS R&B

## 2022-10-19 PROCEDURE — 82607 VITAMIN B-12: CPT

## 2022-10-19 PROCEDURE — 84443 ASSAY THYROID STIM HORMONE: CPT

## 2022-10-19 RX ORDER — ERGOCALCIFEROL 1.25 MG/1
50000 CAPSULE ORAL WEEKLY
Status: DISCONTINUED | OUTPATIENT
Start: 2022-10-19 | End: 2022-10-24 | Stop reason: HOSPADM

## 2022-10-19 RX ORDER — DOXEPIN HYDROCHLORIDE 25 MG/1
25 CAPSULE ORAL NIGHTLY
Status: DISCONTINUED | OUTPATIENT
Start: 2022-10-19 | End: 2022-10-20

## 2022-10-19 RX ORDER — MECOBALAMIN 5000 MCG
5 TABLET,DISINTEGRATING ORAL NIGHTLY
Status: DISCONTINUED | OUTPATIENT
Start: 2022-10-19 | End: 2022-10-24 | Stop reason: HOSPADM

## 2022-10-19 RX ADMIN — DOXEPIN HYDROCHLORIDE 25 MG: 25 CAPSULE ORAL at 21:47

## 2022-10-19 RX ADMIN — BUPROPION HYDROCHLORIDE 200 MG: 100 TABLET, FILM COATED, EXTENDED RELEASE ORAL at 08:31

## 2022-10-19 RX ADMIN — DICYCLOMINE HYDROCHLORIDE 20 MG: 20 TABLET ORAL at 08:31

## 2022-10-19 RX ADMIN — Medication 5 MG: at 21:47

## 2022-10-19 RX ADMIN — LAMOTRIGINE 150 MG: 100 TABLET ORAL at 08:31

## 2022-10-19 RX ADMIN — DULOXETINE 30 MG: 30 CAPSULE, DELAYED RELEASE ORAL at 08:31

## 2022-10-19 RX ADMIN — ACETAMINOPHEN 650 MG: 325 TABLET, FILM COATED ORAL at 16:06

## 2022-10-19 RX ADMIN — PANTOPRAZOLE SODIUM 40 MG: 40 TABLET, DELAYED RELEASE ORAL at 06:30

## 2022-10-19 RX ADMIN — LAMOTRIGINE 150 MG: 100 TABLET ORAL at 21:47

## 2022-10-19 ASSESSMENT — PAIN - FUNCTIONAL ASSESSMENT: PAIN_FUNCTIONAL_ASSESSMENT: ACTIVITIES ARE NOT PREVENTED

## 2022-10-19 ASSESSMENT — PAIN SCALES - GENERAL
PAINLEVEL_OUTOF10: 3
PAINLEVEL_OUTOF10: 5

## 2022-10-19 ASSESSMENT — PAIN DESCRIPTION - DESCRIPTORS: DESCRIPTORS: ACHING

## 2022-10-19 ASSESSMENT — PAIN DESCRIPTION - ORIENTATION: ORIENTATION: RIGHT;LEFT

## 2022-10-19 ASSESSMENT — PAIN DESCRIPTION - LOCATION: LOCATION: HEAD

## 2022-10-19 NOTE — PROGRESS NOTES
[facility] Daily [facility]  Nursing Progress Note    Room: [facility]-01   Name: Devonte Argueta   Age: 25 y.o. Gender: female   Dx: Depression with suicidal ideation  Precautions: close watch and suicide risk  Inpatient Status: voluntary       SLEEP:  Sleep Quality Good  Sleep Medications: Yes doxepin   PRN Sleep Meds: No       MEDICAL:  Other PRN Meds: Yes-Atarax  Med Compliant: Yes  Accu-Chek: No  Oxygen/CPAP/BiPAP: No  CIWA/CINA: No   PAIN Assessment: none  Side Effects from medication: No      Metabolic Screening:  No results found for: LABA1C  No results found for: CHOL  No results found for: TRIG  No results found for: HDL  No components found for: LDLCAL  No results found for: LABVLDL  Body mass index is 41.48 kg/m². BP Readings from Last 2 Encounters:   10/18/22 121/73   12/20/21 120/78         Medical Bed:   Is patient in a medical bed? no   If medical bed is in use, has nursing secured room while patient is awake and out of the room? NA  Has safety checks by nursing been completed on the bed/room this shift? NA    Protective Factors:  Patient identifies protective factors with nursing staff as follows: Identifies reasons for living: Yes   Supportive Social Network or family: Yes    Belief that suicide is immoral/high spirituality: Yes   Responsibility to family or others/living with family: Yes   Fear of death or dying due to pain and suffering: Yes   Engaged in work or school: No     If Patient is unable to identify, reason why? N/A      PSYCH:  Depression: 9   Anxiety: 8   SI denies suicidal ideation   Risk of Suicide: Low Risk  HI Negative for homicidal ideation      AVH:no If Hallucinations are present, describe? N/A        GENERAL:  Appetite: good   Percent Meals: N/A   Social: Yes   Speech: normal   Appearance: appropriately dressed and healthy looking    GROUP:  Group Participation: No  Participation Quality: None    Notes:   PT appears very anxious at first encounter.  PT requested PRN medication for anxiety. PT is cooperative with good eye contact. PT observed playing solTribeHRire this shift in the dinning area. PT states the atarax calmed her down and she was able to sit and concentrate on the game independently.        Electronically signed by Simran Jorge RN on 10/19/22 at 3:16 AM CDT

## 2022-10-19 NOTE — PLAN OF CARE
Problem: Self Harm/Suicidality  Goal: Will have no self-injury during hospital stay  Outcome: Progressing     Problem: Depression  Goal: Will be euthymic at discharge  Outcome: Progressing     Problem: Anxiety  Goal: Will report anxiety at manageable levels  Outcome: Progressing     Problem: Drug Abuse/Detox  Goal: Will have no detox symptoms and will verbalize plan for changing drug-related behavior  Outcome: Progressing     Problem: Sleep Disturbance  Goal: Will exhibit normal sleeping pattern  Outcome: Progressing

## 2022-10-19 NOTE — PROGRESS NOTES
Nurse went to give morning medication and PT reports poor sleep, stating she \"tossed and turned all night\".     Electronically signed by Varghese Young RN on 10/19/2022 at 6:36 AM

## 2022-10-19 NOTE — PLAN OF CARE
Problem: Anxiety  Goal: Will report anxiety at manageable levels  10/19/2022 1147 by Adis Myers  Outcome: Progressing   Group Therapy Note     Date: 10/19/2022  Start Time: 1100  End Time:  2466  Number of Participants: 7     Type of Group: Psychoeducation     Wellness Binder Information  Module Name:  staying well  Session Number:  1     Patient's Goal:  daily maintenance and coping skills     Notes:  pt acknowledged use of positive coping skills daily to help stay well.      Status After Intervention:  Improved     Participation Level: Interactive     Participation Quality: Appropriate, Attentive, and Sharing        Speech:  normal        Thought Process/Content: Logical        Affective Functioning: Congruent        Mood: congruent        Level of consciousness:  Alert, Oriented x4, and Attentive        Response to Learning: Able to verbalize current knowledge/experience        Endings: None Reported     Modes of Intervention: Education        Discipline Responsible: Psychoeducational Specialist        Signature:  Adis Myers

## 2022-10-19 NOTE — PROGRESS NOTES
Group Therapy Note    Start Time: 800  End Time:  310  Number of Participants: 11    Type of Group: Community Meeting       Patient's Goal:  \"JRAGD foggy\"      Notes:      Participation Level:  Active Listener       Participation Quality: Appropriate      Thought Process/Content: Logical      Affective Functioning: Congruent      Mood:  calm      Level of consciousness:  Alert      Modes of Intervention: Support      Discipline Responsible: Behavioral Health Tech II      Signature:  Ritika Zheng

## 2022-10-19 NOTE — PROGRESS NOTES
Treatment Team Note:     Target Symptoms/Reason for admission: Per nursing admission assessment - Reason for Admission: Per ER provider note -Per ER provider admit note -Leopold Bride a 25 y.o. female who presents to the emergency department for evaluation of depression and suicidal thoughts. Pt tells me that she has history of depression followed by he pcp. She relates that she has had thoughts of suicide recently discussing this today with her therapist. She tells me that she has reoccurring thoughts of \"just dying\". She does not endorse specific plan for suicide to me. She tells me that she has been sleeping more than usual. She relates that she has had recent treatment for sinus infection with overall improvement. She does not endorse hallucinations or homicidal thoughts. She admits to smoking marijuana, drinks liquor on weekends and tells me that she lives with roommate, attends college studying to be an . Diagnoses per psych provider: Depression with suicidal ideation [F32. Abhay Bier     Therapist met with treatment team to discuss patients treatment and discharge plans.      Patient's aftercare plan is: SW will meet with patient to gather information     Aftercare appointments made: No - SW will make discharge appointments     Pt lives with: roommates     Collateral obtained from: roommates  Collateral obtained on:10/18/22     Attending groups: yes     Behavior: calm     Has patient been completing ADL's:  yes     SI:  patient denies SI  Plan: no   If yes describe: N/A - patient denies plan  HI:  patient denies HI  If present describe: N/A  Delusions: patient denies delusions  If present describe: N/A  Hallucinations: patient denies hallucinations  If present describe: N/A     Patient rates their -- Depression: 1-10:  0  Anxiety:1-10:  0     Sleeping:yes     Taking medication: yes     Misc:                                                             Revision History

## 2022-10-19 NOTE — PROGRESS NOTES
Department of Psychiatry  Attending Progress Note      SUBJECTIVE:    25 y.o. female with previous psychiatric history of depression, borderline personality disorder, PTSD, who has been admitted to our psychiatric unit secondary to worsening of the depression and suicidal ideations. Patient has been seen in treatment team room. She reported that her condition mildly improved during this hospital stay, stated that her mood is \"better\" today. She endorses good appetite and reported decreased quality of sleep during the last night, stated that she experienced difficulties to fall asleep and stay asleep, woke up multiple times during the night and felt tired on the morning. Patient is compliant with currently prescribed medications and denies any side effects. She continues to report feeling of depression and anxiety, and rated both of them as 7-8 out of 10, with 10 being the worst.  She reported beneficial effect of Atarax for her anxiety, stated that she was able to attend groups and function after she took a medication. Patient attends all group activities in the unit and actively participates in those activities. She is social with medical staff and other patients in the unit. She performed her ADLs today and took a shower. Patient denies current active suicidal or homicidal ideations, denies any plans. Also, she denies auditory and visual hallucinations. OBJECTIVE    Physical  VITALS:  /73   Pulse 65   Temp 98.8 °F (37.1 °C) (Temporal)   Resp 16   Ht 5' 6\" (1.676 m)   Wt 257 lb 0.2 oz (116.6 kg)   SpO2 99%   BMI 41.48 kg/m²   TEMPERATURE:  Current - Temp: 98.8 °F (37.1 °C);  Max - Temp  Av.8 °F (37.1 °C)  Min: 98.8 °F (37.1 °C)  Max: 98.8 °F (37.1 °C)  RESPIRATIONS RANGE: Resp  Av  Min: 16  Max: 16  PULSE RANGE: Pulse  Av  Min: 65  Max: 65  BLOOD PRESSURE RANGE:  Systolic (98WVL), YPS:592 , Min:121 , HYP:716  ; Diastolic (53JEN), DZB:05, Min:73, Max:73   PULSE OXIMETRY RANGE: SpO2  Av %  Min: 99 %  Max: 99 %    Review of Systems: 14 point review of systems is negative    Psychological ROS: Positive for presence of anxiety and depression    Mental Status Examination:    Appearance: Appropriately groomed, wearing casual civilian clothes. Made good eye contact. Behavior: Calm, cooperative, friendly, and socially appropriate. No psychomotor retardation/agitation appreciated. Gait unremarkable. Speech: Normal in tone, volume, and quality. Mood: \"better\"   Affect: Mood congruent. Range is full  Thought Process: Appears linear and goal oriented. Thought Content: Patient does not have any current active suicidal and homicidal ideations. No overt delusions or paranoia appreciated. Perceptions: Seems patient does not have any auditory or visual hallucinations at present time. Patient did not appear to be responding to internal stimuli. No overt psychosis. Orientation: to person, place, date, and situation. Alert.    Impulsivity: Questionable  Neurovegitative: Fair appetite, decreased sleep  Insight: Limited  Judgment: Limited       Data    Lab Results   Component Value Date    TSH 2.840 2021     Lab Results   Component Value Date    IDAJBJQZ98 875 10/19/2022     Lab Results   Component Value Date    VITD25 14.7 (L) 10/19/2022        Medications  Current Facility-Administered Medications: acetaminophen (TYLENOL) tablet 650 mg, 650 mg, Oral, Q4H PRN  doxepin (SINEQUAN) capsule 10 mg, 10 mg, Oral, Nightly  dicyclomine (BENTYL) tablet 20 mg, 20 mg, Oral, Daily  buPROPion (WELLBUTRIN SR) extended release tablet 200 mg, 200 mg, Oral, Daily  [START ON 10/21/2022] buPROPion [facility]) extended release tablet 100 mg, 100 mg, Oral, Daily  DULoxetine (CYMBALTA) extended release capsule 30 mg, 30 mg, Oral, Daily  [START ON 10/21/2022] DULoxetine (CYMBALTA) extended release capsule 60 mg, 60 mg, Oral, Daily  lamoTRIgine (LAMICTAL) tablet 150 mg, 150 mg, Oral, BID  pantoprazole (PROTONIX) tablet 40 mg, 40 mg, Oral, QAM AC  polyethylene glycol (GLYCOLAX) packet 17 g, 17 g, Oral, Daily PRN  hydrOXYzine HCl (ATARAX) tablet 25 mg, 25 mg, Oral, TID PRN    ASSESSMENT AND PLAN    DSM 5 DIAGNOSIS:  Major depressive disorder, recurrent, severe, without psychotic features  Borderline personality disorder  History of posttraumatic stress disorder  Cannabis use disorder  Insomnia  Suicidal ideations     Medical conditions pertinent to the patient's mental health  Obesity  GERD  Vitamin D deficiency      Plan:   1. Psychiatric Medications:   Continue current psychotropic medications as recommended. Patient denies side effect of currently prescribed medications. Will increase dose of doxepin from 10 mg to 25 mg at bedtime and start on melatonin 5 mg at bedtime for insomnia. The risks, benefits, side effects, indications, contraindications, alternatives and adverse effects of the medications have been discussed with patient. 2. Medical Issues:    Continue medical monitoring by Dr. General Christine and associates. Will start vitamin D 50,000 units weekly for vitamin D deficiency    3. Disposition:    Discharge patient home when she will be in stable mental condition and adjustment psychotropic medications will be done.      Amount of time spent with patient:    35 minutes with greater than 50% of the time spent in counseling and collaboration of care

## 2022-10-19 NOTE — PLAN OF CARE
Problem: Drug Abuse/Detox  Goal: Will have no detox symptoms and will verbalize plan for changing drug-related behavior  10/19/2022 1614 by Rj Jarrett  Outcome: Progressing    Group Therapy Note     Date: 10/19/2022  Start Time: 3652  End Time:  1600  Number of Participants: 7     Type of Group: Recovery     Wellness Binder Information  Module Name:  emotional wellness  Session Number:  5     Patient's Goal:  obstacles to emotional wellness     Notes:  pt acknowledged negative thinking as an obstacle to emotional wellness.      Status After Intervention:  Improved     Participation Level: Interactive     Participation Quality: Appropriate, Attentive, and Sharing        Speech:  normal        Thought Process/Content: Logical        Affective Functioning: Congruent        Mood: congruent        Level of consciousness:  Alert, Oriented x4, and Attentive        Response to Learning: Able to verbalize current knowledge/experience        Endings: None Reported     Modes of Intervention: Education        Discipline Responsible: Psychoeducational Specialist        Signature:  Rj Jarrett

## 2022-10-19 NOTE — PSYCHOTHERAPY
Group Therapy Note    Date: 10/19/2022  Start Time: 1330  End Time:  1400  Number of Participants: 6    Type of Group: SPIRITUALITY     Wellness Binder Information  Module Name:  Mindfulness  Session Number:      Patient's Goal:  To rest the mind. .. Notes:      Status After Intervention:  Improved    Participation Level:  Active Listener and Interactive    Participation Quality: Appropriate, Attentive, and Sharing      Speech:  normal      Thought Process/Content:       Affective Functioning: Congruent      Mood: calm      Level of consciousness:  Oriented x4      Response to Learning: Able to verbalize current knowledge/experience      Endings:     Modes of Intervention: Education and Activity      Discipline Responsible:       Signature:  Reza Laguerre  [place] [facility]

## 2022-10-19 NOTE — PROGRESS NOTES
[facility] Adult Unit Daily Assessment  Nursing Progress Note     Room: [facility]607-01   Name: Saintclair Painter   Age: 25 y.o. Gender: female   Dx: Depression with suicidal ideation  Precautions: close watch and suicide risk  Inpatient Status: voluntary         SLEEP:  Sleep Quality poor  Patient reports she tossed and turned all night        MEDICAL:  Other PRN Meds: Yes-Atarax  Med Compliant: Yes  Accu-Chek: No  Oxygen/CPAP/BiPAP: No  CIWA/CINA: No   PAIN Assessment: none  Side Effects from medication: No        Metabolic Screening:  No results found for: LABA1C  No results found for: CHOL  No results found for: TRIG  No results found for: HDL  No components found for: LDLCAL  No results found for: LABVLDL  Body mass index is 41.48 kg/m². BP Readings from Last 2 Encounters:   10/18/22 121/73   12/20/21 120/78            Medical Bed:   Is patient in a medical bed? no   If medical bed is in use, has nursing secured room while patient is awake and out of the room? NA  Has safety checks by nursing been completed on the bed/room this shift? NA     Protective Factors:  Patient identifies protective factors with nursing staff as follows: Identifies reasons for living: Yes              Supportive Social Network or family: Yes               Belief that suicide is immoral/high spirituality: Yes              Responsibility to family or others/living with family: Yes              Fear of death or dying due to pain and suffering: Yes              Engaged in work or school: No                If Patient is unable to identify, reason why? N/A        PSYCH:  Depression: 8  Anxiety: 8   SI denies suicidal ideation     HI Negative for homicidal ideation      AVH:no If Hallucinations are present, describe?  N/A           GENERAL:  Appetite: good   Percent Meals: 100%  Social: Yes   Speech: normal   Appearance: appropriately dressed and healthy looking     GROUP:  Group Participation: No  Participation Quality: None    Notes: patient is observed in dining area socializing with peers. She is wearing casual attire, appropriately dressed and well groomed. Affect is congruent. Appetite is good but reports poor sleep last night. She is compliant with medications with no side effects reported. Attending groups. She has been social with peers. Denies suicidal ideation, homicidal ideation, and hallucinations. No evidence of delusions or paranoia.      Electronically signed by Sabine Guerrero RN on 10/19/22 at 11:11 AM CDT

## 2022-10-19 NOTE — H&P
HISTORY and PHYSICAL      CHIEF COMPLAINT:  Depression, SI    Reason for Admission:  Depression, SI    History Obtained From:  Patient, chart    HISTORY OF PRESENT ILLNESS:      The patient is a 25 y.o. female who is admitted to the [place] [address] unit with worsening mood issues. She has no c/o CP or SOA. She has no abdominal pain or N/V. She has no dysuria. She has no HA or dizziness. She has no fevers. Past Medical History:        Diagnosis Date    Depression     PCOS (polycystic ovarian syndrome)      Past Surgical History:        Procedure Laterality Date    CYST REMOVAL      TONSILLECTOMY           Medications Prior to Admission:    Medications Prior to Admission: dicyclomine (BENTYL) 20 MG tablet, Take 20 mg by mouth once  omeprazole (PRILOSEC) 20 MG delayed release capsule, Take 40 mg by mouth daily  [DISCONTINUED] QUEtiapine (SEROQUEL XR) 150 MG TB24 extended release tablet, Take 150 mg by mouth daily  vitamin D (ERGOCALCIFEROL) 1.25 MG ([address] UT) CAPS capsule, Take 1 capsule by mouth once a week for 11 doses  lamoTRIgine (LAMICTAL) 25 MG tablet, Take 2 tablets by mouth daily (Patient taking differently: Take 100 mg by mouth 2 times daily)  [DISCONTINUED] buPROPion (WELLBUTRIN SR) 150 MG extended release tablet, Take 1 tablet by mouth 2 times daily (Patient taking differently: Take 200 mg by mouth 2 times daily)  [DISCONTINUED] traZODone (DESYREL) 50 MG tablet, Take 1 tablet by mouth nightly (Patient taking differently: Take 100 mg by mouth nightly)  [DISCONTINUED] vitamin B-12 500 MCG tablet, Take 1 tablet by mouth daily (Patient not taking: Reported on 10/17/2022)    Allergies:  Milk-related compounds    Social History:   TOBACCO:   reports that she has never smoked. She has never used smokeless tobacco.  ETOH:   reports no history of alcohol use. DRUGS:   reports current drug use. Drug: Marijuana Nat Botello).   MARITAL STATUS:  single  OCCUPATION:  In school and working  Patient currently lives with room mates      Family History:   No family history on file. REVIEW OF SYSTEMS:  Constitutional: neg  CV: neg  Pulmonary: neg  GI: neg  : neg  Psych: depression, SI  Neuro: neg  Skin: neg  MusculoSkeletal: neg  HEENT: neg  Joints: neg    Vitals:  /73   Pulse 65   Temp 98.8 °F (37.1 °C) (Temporal)   Resp 16   Ht 5' 6\" (1.676 m)   Wt 257 lb 0.2 oz (116.6 kg)   SpO2 99%   BMI 41.48 kg/m²     PHYSICAL EXAM:  Gen: NAD, alert, in bed  HEENT: WNL  Lymph: no LAD  Neck: no JVD or masses  Chest: CTA bilat  CV: RRR  Abdomen: NT/ND  Extrem: no C/C/E  Neuro: non focal  Skin: no rashes  Joints: no redness    DATA:  I have reviewed the admission labs and imaging tests.     ASSESSMENT AND PLAN:      Principal Problem:    Severe episode of recurrent major depressive disorder, without psychotic features, SI----follow with Psych    THC use      Delfin Doherty MD  11:50 PM 10/18/2022

## 2022-10-20 PROCEDURE — 99233 SBSQ HOSP IP/OBS HIGH 50: CPT | Performed by: PSYCHIATRY & NEUROLOGY

## 2022-10-20 PROCEDURE — 1240000000 HC EMOTIONAL WELLNESS R&B

## 2022-10-20 PROCEDURE — 6370000000 HC RX 637 (ALT 250 FOR IP): Performed by: PSYCHIATRY & NEUROLOGY

## 2022-10-20 RX ORDER — DOXEPIN HYDROCHLORIDE 25 MG/1
25 CAPSULE ORAL NIGHTLY PRN
Status: DISCONTINUED | OUTPATIENT
Start: 2022-10-20 | End: 2022-10-24 | Stop reason: HOSPADM

## 2022-10-20 RX ORDER — DOXEPIN HYDROCHLORIDE 50 MG/1
50 CAPSULE ORAL NIGHTLY
Status: DISCONTINUED | OUTPATIENT
Start: 2022-10-20 | End: 2022-10-21

## 2022-10-20 RX ADMIN — ERGOCALCIFEROL 50000 UNITS: 1.25 CAPSULE ORAL at 11:29

## 2022-10-20 RX ADMIN — Medication 5 MG: at 20:44

## 2022-10-20 RX ADMIN — DICYCLOMINE HYDROCHLORIDE 20 MG: 20 TABLET ORAL at 08:01

## 2022-10-20 RX ADMIN — DULOXETINE 30 MG: 30 CAPSULE, DELAYED RELEASE ORAL at 08:02

## 2022-10-20 RX ADMIN — HYDROXYZINE HYDROCHLORIDE 25 MG: 25 TABLET, FILM COATED ORAL at 20:44

## 2022-10-20 RX ADMIN — ACETAMINOPHEN 650 MG: 325 TABLET, FILM COATED ORAL at 13:09

## 2022-10-20 RX ADMIN — BUPROPION HYDROCHLORIDE 200 MG: 100 TABLET, FILM COATED, EXTENDED RELEASE ORAL at 08:02

## 2022-10-20 RX ADMIN — LAMOTRIGINE 150 MG: 100 TABLET ORAL at 20:44

## 2022-10-20 RX ADMIN — DOXEPIN HYDROCHLORIDE 50 MG: 50 CAPSULE ORAL at 20:43

## 2022-10-20 RX ADMIN — DOXEPIN HYDROCHLORIDE 25 MG: 25 CAPSULE ORAL at 23:01

## 2022-10-20 RX ADMIN — PANTOPRAZOLE SODIUM 40 MG: 40 TABLET, DELAYED RELEASE ORAL at 06:40

## 2022-10-20 RX ADMIN — LAMOTRIGINE 150 MG: 100 TABLET ORAL at 08:02

## 2022-10-20 ASSESSMENT — PAIN DESCRIPTION - LOCATION: LOCATION: HEAD

## 2022-10-20 ASSESSMENT — PAIN DESCRIPTION - DESCRIPTORS: DESCRIPTORS: ACHING

## 2022-10-20 ASSESSMENT — PAIN SCALES - GENERAL
PAINLEVEL_OUTOF10: 7
PAINLEVEL_OUTOF10: 0

## 2022-10-20 ASSESSMENT — PAIN - FUNCTIONAL ASSESSMENT: PAIN_FUNCTIONAL_ASSESSMENT: ACTIVITIES ARE NOT PREVENTED

## 2022-10-20 ASSESSMENT — PAIN DESCRIPTION - ORIENTATION: ORIENTATION: POSTERIOR;ANTERIOR

## 2022-10-20 NOTE — PROGRESS NOTES
Progress Note  Amanda Kentrell  10/20/2022 12:06 AM  Subjective:   Admit Date:   10/17/2022      CC/ADMIT DX:       Interval History:   Reviewed overnight events and nursing notes. She has no new medical issues. I have reviewed all labs/diagnostics from the last 24hrs. ROS:   I have done a 10 point ROS and all are negative, except what is mentioned in the HPI. ADULT DIET; Regular    Medications:      vitamin D  50,000 Units Oral Weekly    doxepin  25 mg Oral Nightly    melatonin  5 mg Oral Nightly    dicyclomine  20 mg Oral Daily    buPROPion  200 mg Oral Daily    [START ON 10/21/2022] buPROPion  100 mg Oral Daily    DULoxetine  30 mg Oral Daily    [START ON 10/21/2022] DULoxetine  60 mg Oral Daily    lamoTRIgine  150 mg Oral BID    pantoprazole  40 mg Oral QAM AC           Objective:   Vitals: /89   Pulse 63   Temp 98.4 °F (36.9 °C) (Temporal)   Resp 16   Ht 5' 6\" (1.676 m)   Wt 257 lb 0.2 oz (116.6 kg)   SpO2 98%   BMI 41.48 kg/m²  No intake or output data in the 24 hours ending 10/20/22 0006  General appearance: alert and cooperative with exam  Extremities: extremities normal, atraumatic, no cyanosis or edema  Neurologic:  No obvious focal neurologic deficits. Skin: no rashes    Assessment and Plan:   Principal Problem:    Depression with suicidal ideation  Active Problems:    Major depressive disorder, recurrent severe without psychotic features ([address]  Resolved Problems:    * No resolved hospital problems. *    Vit D Def    Plan:   Continue present medication(s)    Replace Vit D   Follow with Psych      Discharge planning:   her home     Reviewed treatment plans with the patient and/or family.              Electronically signed by Aashish Pinto MD on 10/20/2022 at 12:06 AM

## 2022-10-20 NOTE — PROGRESS NOTES
Treatment Team Note:     Target Symptoms/Reason for admission: Per nursing admission assessment - Reason for Admission: Per ER provider note -Per ER provider admit note -Elicia Stanley a 25 y.o. female who presents to the emergency department for evaluation of depression and suicidal thoughts. Pt tells me that she has history of depression followed by he pcp. She relates that she has had thoughts of suicide recently discussing this today with her therapist. She tells me that she has reoccurring thoughts of \"just dying\". She does not endorse specific plan for suicide to me. She tells me that she has been sleeping more than usual. She relates that she has had recent treatment for sinus infection with overall improvement. She does not endorse hallucinations or homicidal thoughts. She admits to smoking marijuana, drinks liquor on weekends and tells me that she lives with roommate, attends college studying to be an . Diagnoses per psych provider: Depression with suicidal ideation [F32. Leon Del     Therapist met with treatment team to discuss patients treatment and discharge plans. Patient's aftercare plan is: SW will meet with patient to gather information     Aftercare appointments made: No - SW will make discharge appointments     Pt lives with: roommates     Collateral obtained from: roommates  Collateral obtained on:10/18/22     Attending groups: yes     Behavior: Pt is in the dining area during this assessment,she is calm ,pleasant and cooperative. Pt has been social with staff and peers, played Wii with peers,talked on the phone ,and watched TV. Pt is medication compliant,attends groups,performs ADL's,she is appropriately groomed,she has good appetite. Pt does states she has poor sleep and reports passive SI and she contracts for safety. Will continue to monitor.      Has patient been completing ADL's:  yes     SI:  patient denies SI  Plan: no   If yes describe: N/A - patient denies plan  HI: patient denies HI  If present describe: N/A  Delusions: patient denies delusions  If present describe: N/A  Hallucinations: patient denies hallucinations  If present describe: N/A     Patient rates their -- Depression: 1-10:  7  Anxiety:1-10:  5     Sleeping:yesSleep Quality Poor  Sleep Medications: Yes doxepin 25 mg  PRN Sleep Meds: No      Taking medication: yes     Misc:

## 2022-10-20 NOTE — PROGRESS NOTES
Department of Psychiatry  Attending Progress Note      SUBJECTIVE:    25 y.o. female with previous psychiatric history of depression, borderline personality disorder, PTSD, who has been admitted to our psychiatric unit secondary to worsening of the depression and suicidal ideations. Patient has been seen in treatment team room. She reported that her condition significantly improved during this hospital stay and her mood is \"better\" today. She endorses good appetite and continues to report decreased quality of sleep, stated that she experienced difficulties to fall asleep, woke up multiple times during the night and was not able to sleep after 3AM anymore. Patient is compliant with currently prescribed medications and denies any side effects. She continues to endorse mild feeling of anxiety and mild depression, and rated both of them as 5 out of 10, with 10 being the worst.  Patient attends all group activities in the unit and participates in those activities. She is social with medical staff and other patients in the unit. She performs her ADLs daily. Patient denies current active suicidal or homicidal ideations, denies any plans. Also, she denies auditory and visual hallucinations. OBJECTIVE    Physical  VITALS:  /78   Pulse 78   Temp (!) 96 °F (35.6 °C) (Temporal)   Resp 18   Ht 5' 6\" (1.676 m)   Wt 257 lb 0.2 oz (116.6 kg)   SpO2 100%   BMI 41.48 kg/m²   TEMPERATURE:  Current - Temp: (!) 96 °F (35.6 °C);  Max - Temp  Av.2 °F (36.2 °C)  Min: 96 °F (35.6 °C)  Max: 98.4 °F (36.9 °C)  RESPIRATIONS RANGE: Resp  Av.7  Min: 16  Max: 18  PULSE RANGE: Pulse  Av  Min: 63  Max: 78  BLOOD PRESSURE RANGE:  Systolic (41NTW), AFE:990 , Min:104 , VEM:796  ; Diastolic (02BLN), XMJ:29, Min:78, Max:89   PULSE OXIMETRY RANGE: SpO2  Av.7 %  Min: 98 %  Max: 100 %    Review of Systems: 14 point review of systems is negative    Psychological ROS: Positive for presence of mild anxiety and mild depression. Mental Status Examination:    Appearance: Appropriately groomed, wearing casual civilian clothes. Made good eye contact. Behavior: Calm, cooperative, friendly, and socially appropriate. No psychomotor retardation/agitation appreciated. Gait unremarkable. Speech: Normal in tone, volume, and quality. Mood: \"better\"   Affect: Mood congruent. Range is full. Thought Process: Appears linear and goal oriented. Thought Content: Patient does not have any current active suicidal and homicidal ideations. No overt delusions or paranoia appreciated. Perceptions: Seems patient does not have any auditory or visual hallucinations at present time. Patient did not appear to be responding to internal stimuli. No overt psychosis. Orientation: to person, place, date, and situation. Alert.    Impulsivity: Improved  Neurovegitative: Good appetite, decreased sleep  Insight: Improved  Judgment: Limited       Data  No new lab tests results to review    Medications  Current Facility-Administered Medications: vitamin D (ERGOCALCIFEROL) capsule 50,000 Units, 50,000 Units, Oral, Weekly  doxepin (SINEQUAN) capsule 25 mg, 25 mg, Oral, Nightly  melatonin disintegrating tablet 5 mg, 5 mg, Oral, Nightly  acetaminophen (TYLENOL) tablet 650 mg, 650 mg, Oral, Q4H PRN  dicyclomine (BENTYL) tablet 20 mg, 20 mg, Oral, Daily  [START ON 10/21/2022] buPROPion [facility] SR) extended release tablet 100 mg, 100 mg, Oral, Daily  [START ON 10/21/2022] DULoxetine (CYMBALTA) extended release capsule 60 mg, 60 mg, Oral, Daily  lamoTRIgine (LAMICTAL) tablet 150 mg, 150 mg, Oral, BID  pantoprazole (PROTONIX) tablet 40 mg, 40 mg, Oral, QAM AC  polyethylene glycol (GLYCOLAX) packet 17 g, 17 g, Oral, Daily PRN  hydrOXYzine HCl (ATARAX) tablet 25 mg, 25 mg, Oral, TID PRN    ASSESSMENT AND PLAN    DSM 5 DIAGNOSIS:  Major depressive disorder, recurrent, severe, without psychotic features  Borderline personality disorder  History of posttraumatic stress disorder  Cannabis use disorder  Insomnia  Suicidal ideations     Medical conditions pertinent to the patient's mental health  Obesity  GERD  Vitamin D deficiency        Plan:   1. Psychiatric Medications:   Continue current psychotropic medications as recommended. Patient denies side effect of currently prescribed medications. Will increase dose of doxepin from 25 mg to 50 mg at bedtime for insomnia. The risks, benefits, side effects, indications, contraindications, alternatives and adverse effects of the medications have been discussed with patient. 2. Medical Issues:    Continue medical monitoring by Dr. Ashutosh Braga and associates. Will start vitamin D 50,000 units weekly for vitamin D deficiency     3. Disposition:    Discharge patient home when she will be in stable mental condition and adjustment psychotropic medications will be done.      Amount of time spent with patient:    35 minutes with greater than 50% of the time spent in counseling and collaboration of care

## 2022-10-20 NOTE — PROGRESS NOTES
[facility] Adult Unit Daily Assessment  Nursing Progress Note    Room: [facility]607-01   Name: Vanessa Valerio   Age: 25 y.o. Gender: female   Dx: Depression with suicidal ideation  Precautions: suicide risk  Inpatient Status: voluntary       SLEEP:  Sleep Quality Poor  Sleep Medications: Yes   PRN Sleep Meds: No       MEDICAL:  Other PRN Meds: Yes   Med Compliant: Yes  Accu-Chek: No  Oxygen/CPAP/BiPAP: No  CIWA/CINA: No   PAIN Assessment: none  Side Effects from medication: No      Metabolic Screening:  No results found for: LABA1C  No results found for: CHOL  No results found for: TRIG  No results found for: HDL  No components found for: LDLCAL  No results found for: LABVLDL  Body mass index is 41.48 kg/m². BP Readings from Last 2 Encounters:   10/19/22 134/89   12/20/21 120/78         Medical Bed:   Is patient in a medical bed? no   If medical bed is in use, has nursing secured room while patient is awake and out of the room? NA  Has safety checks by nursing been completed on the bed/room this shift? NA    Protective Factors:  Patient identifies protective factors with nursing staff as follows: Identifies reasons for living: Yes   Supportive Social Network or family: Yes    Belief that suicide is immoral/high spirituality: No   Responsibility to family or others/living with family: Yes   Fear of death or dying due to pain and suffering: No   Engaged in work or school: Yes     If Patient is unable to identify, reason why? PSYCH:  Depression: 6   Anxiety: 3   SI passive   Risk of Suicide: Low Risk  HI Negative for homicidal ideation      AVH:no If Hallucinations are present, describe? GENERAL:  Appetite: good   Percent Meals: 100%   Social: Yes   Speech: normal   Appearance: appropriately dressed and healthy looking    GROUP:  Group Participation: Yes  Participation Quality: Active Listener, interactive    Notes:     Alert, oriented, pleasant, and cooperative.  Up for breakfast this am. Social with others, attends groups, and compliant with medications. Reports slept fair, approximately 3 hours last night, and appetite good. Reports passive SI, denies HI, and AVH. Has good support system and attends school for accounting at [employer] that she reports to be stressful. Currently in her sophomore year. Rates depression 6/10 and anxiety 3/10.      Electronically signed by Genesis Rodriguez RN on 10/20/22 at 2:34 PM CDT

## 2022-10-20 NOTE — PROGRESS NOTES
[facility] Adult Unit Daily Assessment  Nursing Progress Note    Room: [facility]/607-01   Name: Tremayne Lou   Age: 25 y.o. Gender: female   Dx: Depression with suicidal ideation  Precautions: suicide risk  Inpatient Status: voluntary       SLEEP:  Sleep Quality Poor  Sleep Medications: Yes doxepin 25 mg  PRN Sleep Meds: No       MEDICAL:  Other PRN Meds: Yes atarax 25 mg  Med Compliant: Yes  Accu-Chek: No  Oxygen/CPAP/BiPAP: No  CIWA/CINA: No   PAIN Assessment: none  Side Effects from medication: No      Metabolic Screening:  No results found for: LABA1C  No results found for: CHOL  No results found for: TRIG  No results found for: HDL  No components found for: LDLCAL  No results found for: LABVLDL  Body mass index is 41.48 kg/m². BP Readings from Last 2 Encounters:   10/19/22 134/89   12/20/21 120/78         Medical Bed:   Is patient in a medical bed? no   If medical bed is in use, has nursing secured room while patient is awake and out of the room? NA  Has safety checks by nursing been completed on the bed/room this shift? yes    Protective Factors:  Patient identifies protective factors with nursing staff as follows: Identifies reasons for living: Yes   Supportive Social Network or family: Yes    Belief that suicide is immoral/high spirituality: Yes   Responsibility to family or others/living with family: Yes   Fear of death or dying due to pain and suffering: Yes   Engaged in work or school: No     If Patient is unable to identify, reason why? N/A      PSYCH:  Depression: 7   Anxiety: 5   SI passive contracts for safety  Risk of Suicide: Low Risk  HI Negative for homicidal ideation      AVH:no If Hallucinations are present, describe?  N/A        GENERAL:  Appetite: good   Percent Meals: 75%   Social: Yes   Speech: normal   Appearance: appropriately dressed, good hygiene, and healthy looking    GROUP:  Group Participation: Yes  Participation Quality: Active Listener    Notes: Pt is in the dining area during this assessment,she is calm ,pleasant and cooperative. Pt has been social with staff and peers, played Wii with peers,talked on the phone ,and watched TV. Pt is medication compliant,attends groups,performs ADL's,she is appropriately groomed,she has good appetite. Pt does states she has poor sleep and reports passive SI and she contracts for safety. Will continue to monitor.

## 2022-10-20 NOTE — PLAN OF CARE
Problem: Self Harm/Suicidality  Goal: Will have no self-injury during hospital stay  Description: INTERVENTIONS:  1. Q 30 MINUTES: Routine safety checks  2. Q SHIFT & PRN: Assess risk to determine if routine checks are adequate to maintain patient safety  Outcome: Progressing     Problem: Depression  Goal: Will be euthymic at discharge  Description: INTERVENTIONS:  1. Administer medication as ordered  2. Provide emotional support via 1:1 interaction with staff  3. Encourage involvement in milieu/groups/activities  4. Monitor for social isolation  Outcome: Progressing     Problem: Anxiety  Goal: Will report anxiety at manageable levels  Description: INTERVENTIONS:  1. Administer medication as ordered  2. Teach and rehearse alternative coping skills  3. Provide emotional support with 1:1 interaction with staff  Outcome: Progressing  Flowsheets (Taken 10/20/2022 0745)  Will report anxiety at manageable levels:   Administer medication as ordered   Provide emotional support with 1:1 interaction with staff     Problem: Drug Abuse/Detox  Goal: Will have no detox symptoms and will verbalize plan for changing drug-related behavior  Description: INTERVENTIONS:  1. Administer medication as ordered  2. Monitor physical status  3. Provide emotional support with 1:1 interaction with staff  4. Encourage  recovery focused treatment   Outcome: Progressing  Flowsheets (Taken 10/20/2022 0745)  Will have no detox symptoms and will verbalize plan for changing drug-related behavior:   Administer medication as ordered   Provide emotional support with 1:1 interaction with staff     Problem: Sleep Disturbance  Goal: Will exhibit normal sleeping pattern  Description: INTERVENTIONS:  1. Administer medication as ordered  2. Decrease environmental stimuli, including noise, as appropriate  3.  Discourage social isolation and naps during the day  Outcome: Progressing

## 2022-10-20 NOTE — PLAN OF CARE
Problem: Self Harm/Suicidality  Goal: Will have no self-injury during hospital stay  Description: INTERVENTIONS:  1. Q 30 MINUTES: Routine safety checks  2. Q SHIFT & PRN: Assess risk to determine if routine checks are adequate to maintain patient safety  10/20/2022 1327 by Nazia Costello LPC  Outcome: Progressing       Group Therapy Note     Date: 10/20/2022  Start Time: 1000  End Time:  9753  Number of Participants: 9     Type of Group: Psychotherapy     Patient's Goal: Patient will process emotions and actions and how to relate to other or their with others. Patient discussed open communication and feelings and emotions. Notes:  Patient attended process group as scheduled, patient identified a issue to work on today regarding how they will interact and relate to others. Status After Intervention:  Improved     Participation Level:  Active Listener     Participation Quality: Appropriate, Attentive, and Sharing        Speech:  normal        Thought Process/Content: Logical        Affective Functioning: Congruent        Mood: euthymic        Level of consciousness:  Alert        Response to Learning: Able to verbalize current knowledge/experience        Endings: None Reported     Modes of Intervention: Education, Support, and Socialization        Discipline Responsible: /Counselor        Signature:  Nazia Costello [facility]

## 2022-10-21 PROCEDURE — 6370000000 HC RX 637 (ALT 250 FOR IP): Performed by: PSYCHIATRY & NEUROLOGY

## 2022-10-21 PROCEDURE — 99233 SBSQ HOSP IP/OBS HIGH 50: CPT | Performed by: PSYCHIATRY & NEUROLOGY

## 2022-10-21 PROCEDURE — 1240000000 HC EMOTIONAL WELLNESS R&B

## 2022-10-21 RX ADMIN — BUPROPION HYDROCHLORIDE 100 MG: 100 TABLET, FILM COATED, EXTENDED RELEASE ORAL at 08:20

## 2022-10-21 RX ADMIN — Medication 5 MG: at 22:08

## 2022-10-21 RX ADMIN — DOXEPIN HYDROCHLORIDE 75 MG: 50 CAPSULE ORAL at 22:07

## 2022-10-21 RX ADMIN — LAMOTRIGINE 150 MG: 100 TABLET ORAL at 08:20

## 2022-10-21 RX ADMIN — DULOXETINE 60 MG: 60 CAPSULE, DELAYED RELEASE ORAL at 08:20

## 2022-10-21 RX ADMIN — LAMOTRIGINE 150 MG: 100 TABLET ORAL at 22:06

## 2022-10-21 RX ADMIN — PANTOPRAZOLE SODIUM 40 MG: 40 TABLET, DELAYED RELEASE ORAL at 06:22

## 2022-10-21 RX ADMIN — DOXEPIN HYDROCHLORIDE 25 MG: 25 CAPSULE ORAL at 22:55

## 2022-10-21 RX ADMIN — DICYCLOMINE HYDROCHLORIDE 20 MG: 20 TABLET ORAL at 08:19

## 2022-10-21 RX ADMIN — HYDROXYZINE HYDROCHLORIDE 25 MG: 25 TABLET, FILM COATED ORAL at 22:08

## 2022-10-21 ASSESSMENT — PAIN DESCRIPTION - DESCRIPTORS: DESCRIPTORS: ACHING

## 2022-10-21 ASSESSMENT — PAIN - FUNCTIONAL ASSESSMENT: PAIN_FUNCTIONAL_ASSESSMENT: ACTIVITIES ARE NOT PREVENTED

## 2022-10-21 ASSESSMENT — PAIN DESCRIPTION - LOCATION: LOCATION: BACK

## 2022-10-21 ASSESSMENT — PAIN SCALES - GENERAL: PAINLEVEL_OUTOF10: 3

## 2022-10-21 NOTE — PROGRESS NOTES
Group Note    Number of Participants in Group: 9  Number of Patients on Unit:10      Patient attended group:Yes  Reason for Absence:  Intervention for patient absence:        Type of Group:   Wrap-Up/Relaxation    Patient's Goal: See wrap up group sheet    Participation Level:     Active Listener and Interactive           Patient Response to Learning: Yes    Patient's Behavior: Pleasant and Assertive    Is Patient Social/Interacting: Yes    Relaxation:   Television:Yes   Reading:No   Game/Puzzle:Yes   Phone: No       Notes/Comments:      Please see patient's wrap up group sheet for patient's comments       Electronically signed by Zo Ambrose RN on 10/21/22 at 2:47 AM CDT

## 2022-10-21 NOTE — PROGRESS NOTES
Progress Note  Devonte Argueta  10/21/2022 8:22 AM  Subjective:   Admit Date:   10/17/2022      CC/ADMIT DX:       Interval History:   Reviewed overnight events and nursing notes. She denies any new physical complaints. I have reviewed all labs/diagnostics from the last 24hrs. ROS:   I have done a 10 point ROS and all are negative, except what is mentioned in the HPI. ADULT DIET; Regular    Medications:      doxepin  50 mg Oral Nightly    vitamin D  50,000 Units Oral Weekly    melatonin  5 mg Oral Nightly    dicyclomine  20 mg Oral Daily    buPROPion  100 mg Oral Daily    DULoxetine  60 mg Oral Daily    lamoTRIgine  150 mg Oral BID    pantoprazole  40 mg Oral QAM AC           Objective:   Vitals: /82   Pulse 72   Temp 97.7 °F (36.5 °C) (Temporal)   Resp 16   Ht 5' 6\" (1.676 m)   Wt 257 lb 0.2 oz (116.6 kg)   SpO2 100%   BMI 41.48 kg/m²  No intake or output data in the 24 hours ending 10/21/22 1316  General appearance: alert and cooperative with exam  Extremities: extremities normal, atraumatic, no cyanosis or edema  Neurologic:  No obvious focal neurologic deficits. Skin: no rashes    Assessment and Plan:   Principal Problem:    Depression with suicidal ideation  Active Problems:    Major depressive disorder, recurrent severe without psychotic features ([address]  Resolved Problems:    * No resolved hospital problems. *    Vit D Def    Plan:   Continue present medication(s)    She is medically stable. I will monitor for any changes or concerns. Follow with Psych      Discharge planning:   her home     Reviewed treatment plans with the patient and/or family.              Electronically signed by Jocelyn Damon MD on 10/21/2022 at 8:22 AM

## 2022-10-21 NOTE — PROGRESS NOTES
Treatment Team Note:     Target Symptoms/Reason for admission: Per nursing admission assessment - Reason for Admission: Per ER provider note -Per ER provider admit note -Federico Miller a 25 y.o. female who presents to the emergency department for evaluation of depression and suicidal thoughts. Pt tells me that she has history of depression followed by he pcp. She relates that she has had thoughts of suicide recently discussing this today with her therapist. She tells me that she has reoccurring thoughts of \"just dying\". She does not endorse specific plan for suicide to me. She tells me that she has been sleeping more than usual. She relates that she has had recent treatment for sinus infection with overall improvement. She does not endorse hallucinations or homicidal thoughts. She admits to smoking marijuana, drinks liquor on weekends and tells me that she lives with roommate, attends college studying to be an . Diagnoses per psych provider: Depression with suicidal ideation [F32. Rod Howell     Therapist met with treatment team to discuss patients treatment and discharge plans. Patient's aftercare plan is: SW will meet with patient to gather information     Aftercare appointments made: No - SW will make discharge appointments     Pt lives with: roommates     Collateral obtained from: roommates  Collateral obtained on:10/18/22     Attending groups: yes     Behavior: PT is in the day room talking and laughing with others on the unit. PT reports manic/hyper activity increasing. PT does report improvement in depressive symptoms and is happy that she is seeing this improvement. PT is polite and social with staff with good eye contact. PT cannot stand still while speaking with nurse at this encounter.       Has patient been completing ADL's:  yes     SI:  patient denies SI  Plan: no   If yes describe: N/A - patient denies plan  HI:  patient denies HI  If present describe: N/A  Delusions: patient denies delusions  If present describe: N/A  Hallucinations: patient denies hallucinations  If present describe: N/A     Patient rates their -- Depression: 1-10:  5  Anxiety:1-10:  2     Sleeping:yesSleep Quality Poor  Sleep Medications: Yes doxepin 25 mg  PRN Sleep Meds: No      Taking medication: yes     Misc:

## 2022-10-21 NOTE — PROGRESS NOTES
[facility] Adult Unit Daily Assessment  Nursing Progress Note    Room: [facility]/607-01   Name: Marita Aguilar   Age: 25 y.o. Gender: female   Dx: Depression with suicidal ideation  Precautions: close watch and suicide risk  Inpatient Status: voluntary       SLEEP:  Sleep Quality Poor  Sleep Medications: Yes doxepin   PRN Sleep Meds: Yes doxepin      MEDICAL:  Other PRN Meds: Yes atarax  Med Compliant: Yes  Accu-Chek: No  Oxygen/CPAP/BiPAP: No  CIWA/CINA: No   PAIN Assessment: none  Side Effects from medication: No      Metabolic Screening:  No results found for: LABA1C  No results found for: CHOL  No results found for: TRIG  No results found for: HDL  No components found for: LDLCAL  No results found for: LABVLDL  Body mass index is 41.48 kg/m². BP Readings from Last 2 Encounters:   10/20/22 136/81   12/20/21 120/78         Medical Bed:   Is patient in a medical bed? no   If medical bed is in use, has nursing secured room while patient is awake and out of the room? NA  Has safety checks by nursing been completed on the bed/room this shift? N/A    Protective Factors:  Patient identifies protective factors with nursing staff as follows: Identifies reasons for living: Yes   Supportive Social Network or family: Yes    Belief that suicide is immoral/high spirituality: No   Responsibility to family or others/living with family: Yes   Fear of death or dying due to pain and suffering: Yes   Engaged in work or school: No     If Patient is unable to identify, reason why? N/A      PSYCH:  Depression: 5   Anxiety: 2   SI denies suicidal ideation   Risk of Suicide: Low Risk  HI Negative for homicidal ideation      AVH:no If Hallucinations are present, describe?  N/A        GENERAL:  Appetite: good   Percent Meals: N/A   Social: Yes   Speech: pressured   Appearance: appropriately dressed, good hygiene, and healthy looking    GROUP:  Group Participation: Yes  Participation Quality: Active Listener and Interactive    Notes:     PT is in the day room talking and laughing with others on the unit. PT reports manic/hyper activity increasing. PT does report improvement in depressive symptoms and is happy that she is seeing this improvement. PT is polite and social with staff with good eye contact. PT cannot stand still while speaking with nurse at this encounter.      Electronically signed by Kimberly Thomason RN on 10/21/22 at 2:35 AM CDT

## 2022-10-21 NOTE — PROGRESS NOTES
Group Therapy Note    Start Time: 375  End Time:  825  Number of Participants: 8    Type of Group: Community Meeting       Patient's Goal:  \"Redirecting neg. thoughts\"      Notes:        Participation Level:  Active Listener       Participation Quality: Appropriate      Thought Process/Content: Logical      Affective Functioning: Congruent      Mood:  Calm      Level of consciousness:  Alert      Modes of Intervention: Support      Discipline Responsible: Behavioral Health Tech II      Signature:  Theron Campbell

## 2022-10-21 NOTE — PROGRESS NOTES
Department of Psychiatry  Attending Progress Note      SUBJECTIVE:    25 y.o. female with previous psychiatric history of depression, borderline personality disorder, PTSD, who has been admitted to our psychiatric unit secondary to worsening of the depression and suicidal ideations. Patient has been seen in treatment team room. She reported that her condition mildly improved since yesterday, stated that her mood is \"more anxious today\". Patient reported good appetite and continues to endorse decreased quality of sleep, stated that she experienced difficulties to fall asleep and it did require to take extra dose of doxepin PRN, and then she slept the rest of the night without significant issues. Patient stated that she slept 6-7 hours during the last night. She is compliant with currently prescribed medications and denies any side effects. Patient continues to endorse mild feeling of anxiety and mild depression, and rated both of them as 3-4 out of 10, with 10 being the worst.  She denies any other affective symptomatology. Patient attends group activities in the unit and actively participates in those activities. She is social with medical staff and other patients in the unit. Patient performs her ADLs daily. She denies current active suicidal or homicidal ideations, denies any plans. Also, she denies auditory and visual hallucinations. Patient did not endorse any delusions or paranoid thoughts. OBJECTIVE    Physical  VITALS:  /82   Pulse 72   Temp 97.7 °F (36.5 °C) (Temporal)   Resp 16   Ht 5' 6\" (1.676 m)   Wt 257 lb 0.2 oz (116.6 kg)   SpO2 100%   BMI 41.48 kg/m²   TEMPERATURE:  Current - Temp: 97.7 °F (36.5 °C);  Max - Temp  Av.3 °F (36.3 °C)  Min: 96 °F (35.6 °C)  Max: 98.2 °F (36.8 °C)  RESPIRATIONS RANGE: Resp  Av.7  Min: 16  Max: 22  PULSE RANGE: Pulse  Av.3  Min: 72  Max: 78  BLOOD PRESSURE RANGE:  Systolic (29QUV), IWO:035 , Min:104 , KEW:853  ; Diastolic (24hrs), Av, Min:78, Max:82   PULSE OXIMETRY RANGE: SpO2  Av %  Min: 97 %  Max: 100 %    Review of Systems: 14 point review of systems is negative    Psychological ROS: Positive for presence of mild anxiety and mild depression    Mental Status Examination:    Appearance: Appropriately groomed, wearing casual civilian clothes. Made good eye contact. Behavior: Calm, cooperative, friendly, and socially appropriate. No psychomotor retardation/agitation appreciated. Gait unremarkable. Speech: Normal in tone, volume, and quality. Mood: \"More anxious today\"   Affect: Mood congruent. Range is full. Thought Process: Appears linear and goal oriented. Thought Content: Patient does not have any current active suicidal and homicidal ideations. No overt delusions or paranoia appreciated. Perceptions: Seems patient does not have any auditory or visual hallucinations at present time. Patient did not appear to be responding to internal stimuli. No overt psychosis. Orientation: to person, place, date, and situation. Alert. Impulsivity: Improved  Neurovegitative: Good appetite, decreased sleep  Insight: Limited  Judgment: Limited       Data  No new lab test results to review.     Medications  Current Facility-Administered Medications: doxepin (SINEQUAN) capsule 50 mg, 50 mg, Oral, Nightly  doxepin (SINEQUAN) capsule 25 mg, 25 mg, Oral, Nightly PRN  vitamin D (ERGOCALCIFEROL) capsule 50,000 Units, 50,000 Units, Oral, Weekly  melatonin disintegrating tablet 5 mg, 5 mg, Oral, Nightly  acetaminophen (TYLENOL) tablet 650 mg, 650 mg, Oral, Q4H PRN  dicyclomine (BENTYL) tablet 20 mg, 20 mg, Oral, Daily  buPROPion [facility] SR) extended release tablet 100 mg, 100 mg, Oral, Daily  DULoxetine (CYMBALTA) extended release capsule 60 mg, 60 mg, Oral, Daily  lamoTRIgine (LAMICTAL) tablet 150 mg, 150 mg, Oral, BID  pantoprazole (PROTONIX) tablet 40 mg, 40 mg, Oral, QAM AC  polyethylene glycol (GLYCOLAX) packet 17 g, 17 g, Oral, Daily PRN  hydrOXYzine HCl (ATARAX) tablet 25 mg, 25 mg, Oral, TID PRN    ASSESSMENT AND PLAN    DSM 5 DIAGNOSIS:  Major depressive disorder, recurrent, severe, without psychotic features  Borderline personality disorder  History of posttraumatic stress disorder  Cannabis use disorder  Insomnia  Suicidal ideations     Medical conditions pertinent to the patient's mental health  Obesity  GERD  Vitamin D deficiency        Plan:   1. Psychiatric Medications:   Continue current psychotropic medications as recommended. Patient denies side effect of currently prescribed medications. Will increase dose of doxepin from 50 mg to 75 mg at bedtime for insomnia. Will continue to taper patient off of Wellbutrin and will decrease dose from 200 mg to 100 mg daily for the next 3 days and then discontinue. Will increase dose of Cymbalta from 30 mg to 60 mg once a day for depression  The risks, benefits, side effects, indications, contraindications, alternatives and adverse effects of the medications have been discussed with patient. 2. Medical Issues:    Continue medical monitoring by Dr. Malorie Guzman and associates. 3. Disposition:    Discharge patient home when she will be in stable mental condition and adjustment psychotropic medications will be done.      Amount of time spent with patient:    35 minutes with greater than 50% of the time spent in counseling and collaboration of care

## 2022-10-21 NOTE — PLAN OF CARE
Group Therapy Note     Date: 10/21/2022  Start Time: 1100  End Time:  6925  Number of Participants: 6     Type of Group: Psychoeducation     Wellness Binder Information  Module Name:  emotional wellness  Session Number:  1     Patient's Goal:  validation of feelings     Notes:  pt was verbally prompted to attend group. Pt refused. Information about emotional wellness was provided. Status After Intervention:       Participation Level:      Participation Quality:         Speech:           Thought Process/Content:         Affective Functioning:         Mood:         Level of consciousness:          Response to Learning:         Endings:      Modes of Intervention:         Discipline Responsible: Psychoeducational Specialist        Signature:  Jc Robles

## 2022-10-21 NOTE — PLAN OF CARE
Problem: Depression  Goal: Will be euthymic at discharge  Description: INTERVENTIONS:  1. Administer medication as ordered  2. Provide emotional support via 1:1 interaction with staff  3. Encourage involvement in milieu/groups/activities  4. Monitor for social isolation  10/21/2022 1033 by [facility]  Outcome: Progressing     Group Therapy Note     Date: 10/21/2022  Start Time: 1000  End Time:  8546  Number of Participants: 7     Type of Group: Psychotherapy  Patient's Goal: Patient will process emotions and actions and how to relate to other or their with others. Patient discussed open communication and feelings and emotions. Notes:  Patient attended process group as scheduled, patient identified a issue to work on today regarding how they will interact and relate to others. Status After Intervention:  Improved     Participation Level:  Active Listener     Participation Quality: Appropriate, Attentive, and Sharing        Speech:  normal        Thought Process/Content: Logical        Affective Functioning: Congruent        Mood: euthymic        Level of consciousness:  Alert        Response to Learning: Able to verbalize current knowledge/experience        Endings: None Reported     Modes of Intervention: Education, Support, and Socialization        Discipline Responsible: /Counselor        Signature:  [facility]

## 2022-10-21 NOTE — PROGRESS NOTES
[facility] Adult Unit Daily Assessment  Nursing Progress Note    Room: [facility]/607-01   Name: Angel Strickland   Age: 25 y.o. Gender: female   Dx: Depression with suicidal ideation  Precautions: suicide risk  Inpatient Status: voluntary       SLEEP:  Sleep Quality Fair  Sleep Medications: Yes   PRN Sleep Meds: Yes       MEDICAL:  Other PRN Meds: Yes   Med Compliant: Yes  Accu-Chek: No  Oxygen/CPAP/BiPAP: No  CIWA/CINA: No   PAIN Assessment: none  Side Effects from medication: No      Metabolic Screening:  No results found for: LABA1C  No results found for: CHOL  No results found for: TRIG  No results found for: HDL  No components found for: LDLCAL  No results found for: LABVLDL  Body mass index is 41.48 kg/m². BP Readings from Last 2 Encounters:   10/21/22 121/82   12/20/21 120/78         Medical Bed:   Is patient in a medical bed? no   If medical bed is in use, has nursing secured room while patient is awake and out of the room? NA  Has safety checks by nursing been completed on the bed/room this shift? NA    Protective Factors:  Patient identifies protective factors with nursing staff as follows: Identifies reasons for living: Yes   Supportive Social Network or family: Yes    Belief that suicide is immoral/high spirituality: Yes   Responsibility to family or others/living with family: Yes   Fear of death or dying due to pain and suffering: Yes   Engaged in work or school: No     If Patient is unable to identify, reason why? PSYCH:  Depression: 6   Anxiety: 2   SI fleeting thoughts   Risk of Suicide: Low Risk  HI Negative for homicidal ideation      AVH:no If Hallucinations are present, describe?           GENERAL:  Appetite: good   Percent Meals: 100%   Social: Yes   Speech: normal   Appearance: appropriately dressed, appropriately groomed, and healthy looking    GROUP:  Group Participation: Yes  Participation Quality: Active Listener    Notes:     Pt states she did have some difficulty falling sleep last night needing an extra dose of sleep medications. She is social with peers. She is cooperative with staff. She attends groups and is medication compliant. She preforms ADLS. She denies HI and AVH and reports fleeting thoughts of SI and contracts for safety.      Electronically signed by Geni Do RN on 10/21/22 at 1:58 PM CDT

## 2022-10-21 NOTE — PLAN OF CARE
Problem: Self Harm/Suicidality  Goal: Will have no self-injury during hospital stay  Description: INTERVENTIONS:  1. Q 30 MINUTES: Routine safety checks  2. Q SHIFT & PRN: Assess risk to determine if routine checks are adequate to maintain patient safety  Outcome: Progressing     Problem: Depression  Goal: Will be euthymic at discharge  Description: INTERVENTIONS:  1. Administer medication as ordered  2. Provide emotional support via 1:1 interaction with staff  3. Encourage involvement in milieu/groups/activities  4. Monitor for social isolation  Outcome: Progressing     Problem: Anxiety  Goal: Will report anxiety at manageable levels  Description: INTERVENTIONS:  1. Administer medication as ordered  2. Teach and rehearse alternative coping skills  3. Provide emotional support with 1:1 interaction with staff  Outcome: Progressing     Problem: Drug Abuse/Detox  Goal: Will have no detox symptoms and will verbalize plan for changing drug-related behavior  Description: INTERVENTIONS:  1. Administer medication as ordered  2. Monitor physical status  3. Provide emotional support with 1:1 interaction with staff  4. Encourage  recovery focused treatment   Outcome: Progressing     Problem: Sleep Disturbance  Goal: Will exhibit normal sleeping pattern  Description: INTERVENTIONS:  1. Administer medication as ordered  2. Decrease environmental stimuli, including noise, as appropriate  3.  Discourage social isolation and naps during the day  Outcome: Progressing

## 2022-10-21 NOTE — PROGRESS NOTES
PT reports better sleep this evening only waking once.      Electronically signed by Garth Vasquez RN on 10/21/2022 at 6:25 AM

## 2022-10-22 PROCEDURE — 1240000000 HC EMOTIONAL WELLNESS R&B

## 2022-10-22 PROCEDURE — 6370000000 HC RX 637 (ALT 250 FOR IP): Performed by: PSYCHIATRY & NEUROLOGY

## 2022-10-22 PROCEDURE — 99233 SBSQ HOSP IP/OBS HIGH 50: CPT | Performed by: PSYCHIATRY & NEUROLOGY

## 2022-10-22 RX ADMIN — DULOXETINE 60 MG: 60 CAPSULE, DELAYED RELEASE ORAL at 08:43

## 2022-10-22 RX ADMIN — LAMOTRIGINE 150 MG: 100 TABLET ORAL at 08:43

## 2022-10-22 RX ADMIN — LAMOTRIGINE 150 MG: 100 TABLET ORAL at 21:25

## 2022-10-22 RX ADMIN — DICYCLOMINE HYDROCHLORIDE 20 MG: 20 TABLET ORAL at 08:43

## 2022-10-22 RX ADMIN — PANTOPRAZOLE SODIUM 40 MG: 40 TABLET, DELAYED RELEASE ORAL at 06:36

## 2022-10-22 RX ADMIN — BUPROPION HYDROCHLORIDE 100 MG: 100 TABLET, FILM COATED, EXTENDED RELEASE ORAL at 08:43

## 2022-10-22 RX ADMIN — DOXEPIN HYDROCHLORIDE 75 MG: 50 CAPSULE ORAL at 21:26

## 2022-10-22 RX ADMIN — Medication 5 MG: at 21:27

## 2022-10-22 ASSESSMENT — PAIN DESCRIPTION - ORIENTATION: ORIENTATION: INNER

## 2022-10-22 ASSESSMENT — PAIN DESCRIPTION - DESCRIPTORS: DESCRIPTORS: ACHING

## 2022-10-22 ASSESSMENT — PAIN SCALES - GENERAL
PAINLEVEL_OUTOF10: 0
PAINLEVEL_OUTOF10: 4

## 2022-10-22 ASSESSMENT — PAIN - FUNCTIONAL ASSESSMENT: PAIN_FUNCTIONAL_ASSESSMENT: ACTIVITIES ARE NOT PREVENTED

## 2022-10-22 ASSESSMENT — PAIN SCALES - WONG BAKER: WONGBAKER_NUMERICALRESPONSE: 0

## 2022-10-22 ASSESSMENT — PAIN DESCRIPTION - LOCATION: LOCATION: ABDOMEN

## 2022-10-22 NOTE — PROGRESS NOTES
Group Note - wrap up    Number of Participants in Group: 8  Number of Patients on Unit:9      Patient attended group:Yes  Reason for Absence:  Intervention for patient absence:        Type of Group:   Wrap-Up/Relaxation    Patient's Goal: See wrap up group sheet    Participation Level:    Monopolizing, Minimal, and None           Patient Response to Learning: Yes    Patient's Behavior: Anxious, Cooperative, and Pleasant    Is Patient Social/Interacting: Yes    Relaxation:   Television:Yes   Reading:Yes   Game/Puzzle:Yes   Phone: Yes       Notes/Comments:      Please see patient's wrap up group sheet for patient's comments       Electronically signed by Wade Hidalgo RN on 10/22/22 at 5:58 AM CDT

## 2022-10-22 NOTE — PROGRESS NOTES
Progress Note  Herman Pollard  10/21/2022 10:23 PM  Subjective:   Admit Date:   10/17/2022      CC/ADMIT DX:       Interval History:   Reviewed overnight events and nursing notes. She has no physical complaints. I have reviewed all labs/diagnostics from the last 24hrs. ROS:   I have done a 10 point ROS and all are negative, except what is mentioned in the HPI. ADULT DIET; Regular    Medications:      doxepin  75 mg Oral Nightly    vitamin D  50,000 Units Oral Weekly    melatonin  5 mg Oral Nightly    dicyclomine  20 mg Oral Daily    buPROPion  100 mg Oral Daily    DULoxetine  60 mg Oral Daily    lamoTRIgine  150 mg Oral BID    pantoprazole  40 mg Oral QAM AC           Objective:   Vitals: /81   Pulse 86   Temp 99.1 °F (37.3 °C) (Temporal)   Resp 20   Ht 5' 6\" (1.676 m)   Wt 257 lb 0.2 oz (116.6 kg)   SpO2 98%   BMI 41.48 kg/m²  No intake or output data in the 24 hours ending 10/21/22 2223  General appearance: alert and cooperative with exam  Extremities: extremities normal, atraumatic, no cyanosis or edema  Neurologic:  No obvious focal neurologic deficits. Skin: no rashes    Assessment and Plan:   Principal Problem:    Depression with suicidal ideation  Active Problems:    Major depressive disorder, recurrent severe without psychotic features ([address]  Resolved Problems:    * No resolved hospital problems. *    Vit D Def    Plan:   Continue present medication(s)    She remains medically stable. I will monitor for any changes or concerns. Follow with Psych      Discharge planning:   her home     Reviewed treatment plans with the patient and/or family.              Electronically signed by Meagan Ellis MD on 10/21/2022 at 10:23 PM

## 2022-10-22 NOTE — PROGRESS NOTES
[facility] Adult Unit Daily Assessment  Nursing Progress Note    Room: [facility]/607-01   Name: Trace Acuña   Age: 25 y.o. Gender: female   Dx: Depression with suicidal ideation  Precautions: close watch and suicide risk  Inpatient Status: voluntary       SLEEP:  Sleep Quality Fair  Sleep Medications: Yes   PRN Sleep Meds: Yes       MEDICAL:  Other PRN Meds: Yes   Med Compliant: Yes  Accu-Chek: No  Oxygen/CPAP/BiPAP: No  CIWA/CINA: No   PAIN Assessment: none  Side Effects from medication: No      Metabolic Screening:  No results found for: LABA1C  No results found for: CHOL  No results found for: TRIG  No results found for: HDL  No components found for: LDLCAL  No results found for: LABVLDL  Body mass index is 41.48 kg/m². BP Readings from Last 2 Encounters:   10/22/22 116/77   12/20/21 120/78         Medical Bed:   Is patient in a medical bed? no   If medical bed is in use, has nursing secured room while patient is awake and out of the room? no  Has safety checks by nursing been completed on the bed/room this shift? no    Protective Factors:  Patient identifies protective factors with nursing staff as follows: Identifies reasons for living: Yes   Supportive Social Network or family: Yes    Belief that suicide is immoral/high spirituality: Yes   Responsibility to family or others/living with family: Yes   Fear of death or dying due to pain and suffering: Yes   Engaged in work or school: Yes     If Patient is unable to identify, reason why? PSYCH:  Depression: 5  Anxiety:2  SI passive, on and off  Risk of Suicide: Low Risk  HI Negative for homicidal ideation      AVH:no If Hallucinations are present, describe? GENERAL:  Appetite: no change from normal   Percent Meals: 75%   Social: Yes   Speech: normal   Appearance: appropriately dressed and healthy looking    GROUP:  Group Participation: Yes  Participation Quality: Active Listener and Interactive    Notes: Patient is brightened this morning.  Patient is med compliant and attends group. Patient is having mild anxiety and depression. Patient is very cooperative with staff, but seems to be a bit incongruent, as her depression, anxiety and self loathing I believe are hidden in a cheerful exterior affect that may be an act.          Electronically signed by Samual Burkitt, RN on 10/22/22 at 12:53 PM CDT

## 2022-10-22 NOTE — PROGRESS NOTES
Department of Psychiatry  Attending Progress Note      SUBJECTIVE:    25 y.o. female with previous psychiatric history of depression, borderline personality disorder, PTSD, who has been admitted to our psychiatric unit secondary to worsening of the depression and suicidal ideations. Patient has been seen in treatment team room. She reported that her condition significantly improved during this hospital stay and her mood is \"good\" today. She reported good appetite and improved quality of sleep after adjustment of her psychotropic medications, stated that she did not experience any difficulties to fall asleep, woke up only a few times during the night and slept \"I guess 7 hours\" during the last night. Patient is compliant with currently prescribed medications and denies any side effects. She continues to endorse mild feeling of anxiety and depression, and rated both of them as 1-2 out of 10, with 10 being the worst.  Patient attends all group activities in the unit and participates in those activities. She is social with medical staff and other patients in the unit. She performs her ADLs daily and took a shower today. Patient denies current active suicidal or homicidal ideations, denies any plans. Also, she denies auditory and visual hallucinations. Patient did not endorse any delusions or paranoid thoughts. OBJECTIVE    Physical  VITALS:  /77   Pulse 66   Temp 98.1 °F (36.7 °C) (Temporal)   Resp 18   Ht 5' 6\" (1.676 m)   Wt 257 lb 0.2 oz (116.6 kg)   SpO2 97%   BMI 41.48 kg/m²   TEMPERATURE:  Current - Temp: 98.1 °F (36.7 °C);  Max - Temp  Av.6 °F (37 °C)  Min: 98.1 °F (36.7 °C)  Max: 99.1 °F (37.3 °C)  RESPIRATIONS RANGE: Resp  Av  Min: 18  Max: 20  PULSE RANGE: Pulse  Av  Min: 66  Max: 86  BLOOD PRESSURE RANGE:  Systolic (43ZSN), VWY:756 , Min:116 , VVH:967  ; Diastolic (43DYT), USJ:38, Min:77, Max:81   PULSE OXIMETRY RANGE: SpO2  Av.5 %  Min: 97 %  Max: 98 %    Review of Systems: 14 point review of systems is negative    Psychological ROS: Positive for presence of mild anxiety and mild depression    Mental Status Examination:    Appearance: Appropriately groomed, wearing casual civilian clothes. Made good eye contact. Behavior: Calm, cooperative and socially appropriate. No psychomotor retardation/agitation appreciated. Gait unremarkable. Speech: Normal in tone, volume, and quality. Mood: \"good\"   Affect: Mood congruent. Range is full  Thought Process: Appears linear and goal oriented. Thought Content: Patient does not have any current active suicidal and homicidal ideations. No overt delusions or paranoia appreciated. Perceptions: Seems patient does not have any auditory or visual hallucinations at present time. Patient did not appear to be responding to internal stimuli. No overt psychosis. Orientation: to person, place, date, and situation. Alert.    Impulsivity: Improved  Neurovegitative: Good appetite, good sleep  Insight: Improved  Judgment: Improved       Data  No new lab tests results to review    Medications  Current Facility-Administered Medications: doxepin (SINEQUAN) capsule 75 mg, 75 mg, Oral, Nightly  doxepin (SINEQUAN) capsule 25 mg, 25 mg, Oral, Nightly PRN  vitamin D (ERGOCALCIFEROL) capsule 50,000 Units, 50,000 Units, Oral, Weekly  melatonin disintegrating tablet 5 mg, 5 mg, Oral, Nightly  acetaminophen (TYLENOL) tablet 650 mg, 650 mg, Oral, Q4H PRN  dicyclomine (BENTYL) tablet 20 mg, 20 mg, Oral, Daily  buPROPion (WELLBUTRIN SR) extended release tablet 100 mg, 100 mg, Oral, Daily  DULoxetine (CYMBALTA) extended release capsule 60 mg, 60 mg, Oral, Daily  lamoTRIgine (LAMICTAL) tablet 150 mg, 150 mg, Oral, BID  pantoprazole (PROTONIX) tablet 40 mg, 40 mg, Oral, QAM AC  polyethylene glycol (GLYCOLAX) packet 17 g, 17 g, Oral, Daily PRN  hydrOXYzine HCl (ATARAX) tablet 25 mg, 25 mg, Oral, TID PRN    ASSESSMENT AND PLAN    DSM 5 DIAGNOSIS:  Major depressive disorder, recurrent, severe, without psychotic features  Borderline personality disorder  History of posttraumatic stress disorder  Cannabis use disorder  Insomnia  Suicidal ideations     Medical conditions pertinent to the patient's mental health  Obesity  GERD  Vitamin D deficiency        Plan:   1. Psychiatric Medications:   Continue current psychotropic medications as recommended. Patient denies side effect of currently prescribed medications. No changes to the dose of prescribed psychotropic medications today. 2. Medical Issues:    Continue medical monitoring by Dr. Lucrezia Phalen and associates. 3. Disposition:    Discharge patient home when she will be in stable mental condition and adjustment psychotropic medications will be done. Preliminary day of discharge is on Monday on 10/24/2022.      Amount of time spent with patient:    35 minutes with greater than 50% of the time spent in counseling and collaboration of care

## 2022-10-22 NOTE — PROGRESS NOTES
[facility] Adult Unit Daily Assessment  Nursing Progress Note    Room: [facility]607-01   Name: Nighat Fink   Age: 25 y.o. Gender: female   Dx: Depression with suicidal ideation  Precautions: close watch and suicide risk  Inpatient Status: voluntary       SLEEP:  Sleep Quality Good-improved  Sleep Medications: Yes Doxepin 75  PRN Sleep Meds: Yes Doxepin 25      MEDICAL:  Other PRN Meds: Yes atarax  Med Compliant: Yes  Accu-Chek: No  Oxygen/CPAP/BiPAP: No  CIWA/CINA: No   PAIN Assessment: none  Side Effects from medication: No      Metabolic Screening:  No results found for: LABA1C  No results found for: CHOL  No results found for: TRIG  No results found for: HDL  No components found for: LDLCAL  No results found for: LABVLDL  Body mass index is 41.48 kg/m². BP Readings from Last 2 Encounters:   10/21/22 117/81   12/20/21 120/78         Medical Bed:   Is patient in a medical bed? no   If medical bed is in use, has nursing secured room while patient is awake and out of the room? NA  Has safety checks by nursing been completed on the bed/room this shift? NA    Protective Factors:  Patient identifies protective factors with nursing staff as follows: Identifies reasons for living: Yes   Supportive Social Network or family: Yes    Belief that suicide is immoral/high spirituality: Yes   Responsibility to family or others/living with family: Yes   Fear of death or dying due to pain and suffering: Yes   Engaged in work or school: Yes     If Patient is unable to identify, reason why? N/A      PSYCH:  Depression: 5   Anxiety: 1   SI passive   Risk of Suicide: Low Risk  HI Negative for homicidal ideation      AVH:no If Hallucinations are present, describe?  N/A        GENERAL:  Appetite: no change from normal   Percent Meals: n/a   Social: Yes   Speech: normal   Appearance: appropriately dressed, good hygiene, and healthy looking    GROUP:  Group Participation: Yes  Participation Quality: Active Listener and Interactive    Notes: PT observed social, laughing and smiling with peers. PT behavior is not as elevated as the previous evening when pt presented manic. PT is social, pleasant with staff and has good eye contact. SI passive, pt reports SI thoughts are less frequent.      Electronically signed by Jd Recio RN on 10/22/22 at 12:58 AM CDT

## 2022-10-22 NOTE — PLAN OF CARE
Problem: Anxiety  Goal: Will report anxiety at manageable levels  Description: INTERVENTIONS:  1. Administer medication as ordered  2. Teach and rehearse alternative coping skills  3. Provide emotional support with 1:1 interaction with staff  Outcome: Progressing  Flowsheets (Taken 10/22/2022 1228)  Will report anxiety at manageable levels:   Administer medication as ordered   Provide emotional support with 1:1 interaction with staff     Problem: Depression  Goal: Will be euthymic at discharge  Description: INTERVENTIONS:  1. Administer medication as ordered  2. Provide emotional support via 1:1 interaction with staff  3. Encourage involvement in milieu/groups/activities  4.  Monitor for social isolation  Outcome: Progressing     Problem: Self Harm/Suicidality  Goal: Will have no self-injury during hospital stay  Description: INTERVENTIONS:  1. Q 30 MINUTES: Routine safety checks  2. Q SHIFT & PRN: Assess risk to determine if routine checks are adequate to maintain patient safety  Outcome: Progressing

## 2022-10-22 NOTE — PROGRESS NOTES
WRAP UP GROUP NOTE:     Patient's Goal:  Providing feedback as to their own progress in the care-plan provided. Pt's have an opportunity to explore self-reflective skills and share any additional cares and concerns not yet addressed. Pt effectively participated. Energy level:NORMAL  Appetite:POOR  Concentration: NORMAL  Hallucinations:GONE  Depression:SAME  Anxiety:IMPROVED  How I worked today:TRIED A LITTLE  What helps me sleep:READ, JOURNAL  Any questions/complaints/comments:KEEP BEING THE THE BEST!     LIFE SKILLS: RELATIONSHIPS STUFF WITH JUSTIN  SELF ENHANCEMENT:  DRAWING

## 2022-10-23 PROCEDURE — 6370000000 HC RX 637 (ALT 250 FOR IP): Performed by: PSYCHIATRY & NEUROLOGY

## 2022-10-23 PROCEDURE — 1240000000 HC EMOTIONAL WELLNESS R&B

## 2022-10-23 RX ADMIN — PANTOPRAZOLE SODIUM 40 MG: 40 TABLET, DELAYED RELEASE ORAL at 06:45

## 2022-10-23 RX ADMIN — LAMOTRIGINE 150 MG: 100 TABLET ORAL at 21:37

## 2022-10-23 RX ADMIN — Medication 5 MG: at 21:38

## 2022-10-23 RX ADMIN — DULOXETINE 60 MG: 60 CAPSULE, DELAYED RELEASE ORAL at 08:43

## 2022-10-23 RX ADMIN — ACETAMINOPHEN 650 MG: 325 TABLET, FILM COATED ORAL at 12:13

## 2022-10-23 RX ADMIN — DICYCLOMINE HYDROCHLORIDE 20 MG: 20 TABLET ORAL at 08:43

## 2022-10-23 RX ADMIN — HYDROXYZINE HYDROCHLORIDE 25 MG: 25 TABLET, FILM COATED ORAL at 21:50

## 2022-10-23 RX ADMIN — LAMOTRIGINE 150 MG: 100 TABLET ORAL at 08:42

## 2022-10-23 RX ADMIN — BUPROPION HYDROCHLORIDE 100 MG: 100 TABLET, FILM COATED, EXTENDED RELEASE ORAL at 08:43

## 2022-10-23 RX ADMIN — DOXEPIN HYDROCHLORIDE 75 MG: 50 CAPSULE ORAL at 21:36

## 2022-10-23 RX ADMIN — DOXEPIN HYDROCHLORIDE 25 MG: 25 CAPSULE ORAL at 21:52

## 2022-10-23 ASSESSMENT — PAIN SCALES - GENERAL: PAINLEVEL_OUTOF10: 6

## 2022-10-23 ASSESSMENT — PAIN DESCRIPTION - LOCATION
LOCATION: HEAD
LOCATION: ABDOMEN

## 2022-10-23 ASSESSMENT — PAIN - FUNCTIONAL ASSESSMENT
PAIN_FUNCTIONAL_ASSESSMENT: ACTIVITIES ARE NOT PREVENTED
PAIN_FUNCTIONAL_ASSESSMENT: ACTIVITIES ARE NOT PREVENTED

## 2022-10-23 ASSESSMENT — PAIN DESCRIPTION - DESCRIPTORS
DESCRIPTORS: ACHING
DESCRIPTORS: ACHING

## 2022-10-23 ASSESSMENT — PAIN DESCRIPTION - ORIENTATION
ORIENTATION: ANTERIOR
ORIENTATION: MID

## 2022-10-23 NOTE — PROGRESS NOTES
WRAP UP GROUP NOTE:     Patient's Goal:  Providing feedback as to their own progress in the care-plan provided. Pt's have an opportunity to explore self-reflective skills and share any additional cares and concerns not yet addressed. Pt effectively participated. Energy level:Nomal  Appetite:Normal  Concentration: Normal  Hallucinations:Gone  Depression:Same  Anxiety:Gone  How I worked today:Tried a lot  What helps me sleep:Read, Work in journal  Any questions/complaints/comments:I'll miss you guys! Absolutely the best, thank you for taking care of me.

## 2022-10-23 NOTE — PROGRESS NOTES
[facility] Adult Unit Daily Assessment  Nursing Progress Note    Room: [facility]/607-01   Name: Shayy Ashton   Age: 25 y.o. Gender: female   Dx: Depression with suicidal ideation  Precautions: suicide risk  Inpatient Status: voluntary       SLEEP:  Sleep Quality Fair  Sleep Medications: No   PRN Sleep Meds: No       MEDICAL:  Other PRN Meds: No   Med Compliant: Yes  Accu-Chek: No  Oxygen/CPAP/BiPAP: Yes  CIWA/CINA: No   PAIN Assessment: none  Side Effects from medication: No      Metabolic Screening:  No results found for: LABA1C  No results found for: CHOL  No results found for: TRIG  No results found for: HDL  No components found for: LDLCAL  No results found for: LABVLDL  Body mass index is 41.48 kg/m². BP Readings from Last 2 Encounters:   10/23/22 114/70   12/20/21 120/78         Medical Bed:   Is patient in a medical bed? no   If medical bed is in use, has nursing secured room while patient is awake and out of the room? NA  Has safety checks by nursing been completed on the bed/room this shift? NA    Protective Factors:  Patient identifies protective factors with nursing staff as follows: Identifies reasons for living: Yes   Supportive Social Network or family: Yes    Belief that suicide is immoral/high spirituality: Yes   Responsibility to family or others/living with family: Yes   Fear of death or dying due to pain and suffering: No   Engaged in work or school: Yes     If Patient is unable to identify, reason why? PSYCH:  Depression: 5   Anxiety: 0   SI denies suicidal ideation   Risk of Suicide: No Risk  HI Negative for homicidal ideation      AVH:no If Hallucinations are present, describe?          GENERAL:  Appetite: good   Percent Meals: 100%   Social: Yes   Speech: normal   Appearance: appropriately dressed, inappropriately groomed, and healthy looking    GROUP:  Group Participation: Yes  Participation Quality: Active Listener and Interactive    Notes: Patient resting in her room on her bed after spending most of the morning sitting in the television area socializing with other patients. Patient says she got about 6 hours of sleep but kept waking up. Patient said that her afternoon nap was refreshing and stated her plans to now take a shower and socialize some more with other patients. Patient says that she is happy with her socialization on this floor and that she feels she made a lot of friends. Patient said her depression as a 5 but denies anxiety. Patient denies SI, HI, and AVH. Patient said that she believes her depression is probably at her baseline but does not go any lower than that. Will continue to monitor for safety.          Electronically signed by Jesi Little RN on 10/23/22 at 2:52 PM CDT

## 2022-10-23 NOTE — PROGRESS NOTES
[facility] Adult Unit Daily Assessment  Nursing Progress Note    Room: [facility]/607-01   Name: Roman Sweet   Age: 25 y.o. Gender: female   Dx: Depression with suicidal ideation  Precautions: close watch and suicide risk  Inpatient Status: voluntary       SLEEP:  Sleep Quality Good  Sleep Medications: Yes   PRN Sleep Meds: Yes       MEDICAL:  Other PRN Meds: No   Med Compliant: Yes  Accu-Chek: No  Oxygen/CPAP/BiPAP: No  CIWA/CINA: No   PAIN Assessment: none  Side Effects from medication: No      Metabolic Screening:  No results found for: LABA1C  No results found for: CHOL  No results found for: TRIG  No results found for: HDL  No components found for: LDLCAL  No results found for: LABVLDL  Body mass index is 41.48 kg/m². BP Readings from Last 2 Encounters:   10/22/22 104/71   12/20/21 120/78         Medical Bed:   Is patient in a medical bed? no   If medical bed is in use, has nursing secured room while patient is awake and out of the room? NA  Has safety checks by nursing been completed on the bed/room this shift? NA    Protective Factors:  Patient identifies protective factors with nursing staff as follows: Identifies reasons for living: Yes   Supportive Social Network or family: Yes    Belief that suicide is immoral/high spirituality: Yes   Responsibility to family or others/living with family: Yes   Fear of death or dying due to pain and suffering: Yes   Engaged in work or school: Yes     If Patient is unable to identify, reason why? PSYCH:  Depression: 5   Anxiety: 2   SI denies suicidal ideation   Risk of Suicide: No Risk  HI Negative for homicidal ideation      AVH:no If Hallucinations are present, describe? GENERAL:  Appetite: good   Percent Meals:    Social: Yes   Speech: normal   Appearance: appropriately dressed and healthy looking    GROUP:  Group Participation: Yes  Participation Quality: Interactive    Notes:  Patient was in the dayroom coloring and drawing with her peers.  Alert and oriented x4, cooperative and pleasant. Patient stated that she was planning on dropping her college classes this semester. Patient stated that she was worried about her mental health. Continue to monitor for safety.          Electronically signed by Ashwin Mason RN on 10/23/22 at 3:08 AM CDT

## 2022-10-24 VITALS
HEART RATE: 62 BPM | SYSTOLIC BLOOD PRESSURE: 104 MMHG | TEMPERATURE: 97.5 F | RESPIRATION RATE: 16 BRPM | OXYGEN SATURATION: 99 % | HEIGHT: 66 IN | BODY MASS INDEX: 41.31 KG/M2 | DIASTOLIC BLOOD PRESSURE: 69 MMHG | WEIGHT: 257.01 LBS

## 2022-10-24 PROCEDURE — 6370000000 HC RX 637 (ALT 250 FOR IP): Performed by: PSYCHIATRY & NEUROLOGY

## 2022-10-24 PROCEDURE — 99239 HOSP IP/OBS DSCHRG MGMT >30: CPT | Performed by: PSYCHIATRY & NEUROLOGY

## 2022-10-24 PROCEDURE — 5130000000 HC BRIDGE APPOINTMENT

## 2022-10-24 RX ORDER — DULOXETIN HYDROCHLORIDE 60 MG/1
60 CAPSULE, DELAYED RELEASE ORAL DAILY
Qty: 30 CAPSULE | Refills: 1 | Status: SHIPPED | OUTPATIENT
Start: 2022-10-24

## 2022-10-24 RX ORDER — LAMOTRIGINE 150 MG/1
150 TABLET ORAL 2 TIMES DAILY
Qty: 60 TABLET | Refills: 1 | Status: CANCELLED | OUTPATIENT
Start: 2022-10-24

## 2022-10-24 RX ORDER — DICYCLOMINE HCL 20 MG
20 TABLET ORAL DAILY
Qty: 30 TABLET | Refills: 1 | Status: SHIPPED | OUTPATIENT
Start: 2022-10-24

## 2022-10-24 RX ORDER — HYDROXYZINE HYDROCHLORIDE 25 MG/1
25 TABLET, FILM COATED ORAL 3 TIMES DAILY PRN
Qty: 90 TABLET | Refills: 1 | Status: CANCELLED | OUTPATIENT
Start: 2022-10-24

## 2022-10-24 RX ORDER — LAMOTRIGINE 150 MG/1
150 TABLET ORAL 2 TIMES DAILY
Qty: 60 TABLET | Refills: 1 | Status: SHIPPED | OUTPATIENT
Start: 2022-10-24

## 2022-10-24 RX ORDER — DULOXETIN HYDROCHLORIDE 60 MG/1
60 CAPSULE, DELAYED RELEASE ORAL DAILY
Qty: 30 CAPSULE | Refills: 1 | Status: CANCELLED | OUTPATIENT
Start: 2022-10-24

## 2022-10-24 RX ORDER — DOXEPIN HYDROCHLORIDE 100 MG/1
100 CAPSULE ORAL NIGHTLY
Qty: 30 CAPSULE | Refills: 1 | Status: SHIPPED | OUTPATIENT
Start: 2022-10-24

## 2022-10-24 RX ORDER — MECOBALAMIN 5000 MCG
5 TABLET,DISINTEGRATING ORAL NIGHTLY
Qty: 30 TABLET | Refills: 1 | Status: CANCELLED | OUTPATIENT
Start: 2022-10-24

## 2022-10-24 RX ORDER — HYDROXYZINE HYDROCHLORIDE 25 MG/1
25 TABLET, FILM COATED ORAL 3 TIMES DAILY PRN
Qty: 90 TABLET | Refills: 1 | Status: SHIPPED | OUTPATIENT
Start: 2022-10-24

## 2022-10-24 RX ORDER — ERGOCALCIFEROL 1.25 MG/1
50000 CAPSULE ORAL WEEKLY
Qty: 11 CAPSULE | Refills: 0 | Status: SHIPPED | OUTPATIENT
Start: 2022-10-24 | End: 2023-01-03

## 2022-10-24 RX ORDER — MECOBALAMIN 5000 MCG
5 TABLET,DISINTEGRATING ORAL NIGHTLY
Qty: 30 TABLET | Refills: 1 | Status: SHIPPED | OUTPATIENT
Start: 2022-10-24

## 2022-10-24 RX ADMIN — DULOXETINE 60 MG: 60 CAPSULE, DELAYED RELEASE ORAL at 08:46

## 2022-10-24 RX ADMIN — PANTOPRAZOLE SODIUM 40 MG: 40 TABLET, DELAYED RELEASE ORAL at 06:36

## 2022-10-24 RX ADMIN — LAMOTRIGINE 150 MG: 100 TABLET ORAL at 08:45

## 2022-10-24 RX ADMIN — DICYCLOMINE HYDROCHLORIDE 20 MG: 20 TABLET ORAL at 08:46

## 2022-10-24 ASSESSMENT — PAIN SCALES - GENERAL: PAINLEVEL_OUTOF10: 0

## 2022-10-24 NOTE — PLAN OF CARE
Group Therapy Note    Date: 10/24/2022  Start Time: 1115  End Time:  8155  Number of Participants: 4    Type of Group: Cognitive Skills    Wellness Binder Information  Module Name:  Stress  Session Number:  5    Group Goal for Pt: To raise awareness of the effectiveness of diversionary coping skills    Notes:  Pt did not attend group activity. Pt was invited/encouraged. Status After Intervention:      Participation Level:     Participation Quality:       Speech:         Thought Process/Content:       Affective Functioning:       Mood:       Level of consciousness:        Response to Learning:       Endings:     Modes of Intervention:       Discipline Responsible:       Signature:  Meri Stratton

## 2022-10-24 NOTE — PROGRESS NOTES
Discharge Note     Patient is discharging on this date. Patient denies SI, HI, and AVH at this time. Patient reports improvement in behavior and is leaving unit in overall good condition. SW and patient discussed patient's follow up appointments and importance of attending appointments as scheduled, patient voiced understanding and agreement. Patient and SW also discussed patient's safety plan and patient was able to verbally identify: warning signs, coping strategies, places and people that help make the patient feel better/distract negative thoughts, friends/family/agencies/professionals the patient can reach out to in a crisis, and something that is important to the patient/worth living for. Patient was provided the national suicide prevention hotline number (9-236-443-103-275-3570) as well as local community behavioral health [facility]) crisis number for emergencies (2-255-947-585-961-7486). Discharge Disposition: home -lives with friend      Pt to follow up with:  [address] on November 1 , 2022 at 11:00 AM for the intake appointment. Patient will follow up with [facility]  On November 4 , 2022   at 11:30 AM for the medication management appointment.      Referral to outpatient tobacco cessation counseling treatment:  Patient refused referral to outpatient tobacco cessation counseling    SW offered to assist patient with transportation, patient declined transportation assistance

## 2022-10-24 NOTE — DISCHARGE SUMMARY
Discharge Summary     Patient ID:  Keyla Glez  [id number]  24 y.o.  2000    Admit date: 10/17/2022  Discharge date: 10/24/2022    Admitting Physician: Brielle Pisano MD   Attending Physician: Grupo Moore MD  Discharge Provider: Candee Romberg, MD     Admission Diagnoses: Depression with suicidal ideation [F32. A, R45.851]  Major depressive disorder, recurrent severe without psychotic features ([address] [F33.2]    Discharge Diagnoses: Major depressive disorder, recurrent, severe, without psychotic features  Borderline personality disorder  History of posttraumatic stress disorder  Cannabis use disorder  Insomnia  Suicidal ideations     Medical conditions pertinent to the patient's mental health  Obesity  GERD  Vitamin D deficiency    Admission Condition: poor    Discharged Condition: good    Indication for Admission: Worsening of the depression, suicidal ideations    HPI:   The patient is a 25 y.o. female with previous psychiatric history of depression, borderline personality disorder, PTSD, who has been admitted to our psychiatric unit secondary to worsening of the depression and suicidal ideations. Patient is well-known to psychiatry due to previous admission to our psychiatric unit in December 2021. Patient has been seen in treatment team room with presence of the patient's nurse. She reported that after last discharge from the hospital she followed with [facility] behavioral [facility] for therapy for some period of time, however, she was switched for telehealth by her therapist and then patient transferred her medications management to PCP. Patient stated that she started college this year, however, was not doing well, as she experienced worsening of the depression, decreased concentration and decreased motivation.   Patient reported that her primary care provider started her on Seroquel for sleep and mood stabilization, however, patient experienced worsening of the depression and she stopped taking Seroquel. She reported that during the last month her depression progressively became worse to the point that she started experiencing suicidal ideation and she decided to come to the hospital and ask for help. Patient reported compliance with prescribed psychotropic medications, however, stated that she experienced side effect of trazodone-feeling of sleepiness and tiredness during the day. In regards to affective symptomatology, patient reported dysphoric mood, feeling of depression, anxiety, anhedonia, poor motivation, poor concentration, decreased quality of sleep, decreased pleasure in previously enjoyed activities. She denies symptoms of posttraumatic stress disorder - denies recent nightmares, flashbacks, avoidance or hypervigilance. Patient reported feeling of hopelessness and helplessness, denies feeling of worthlessness. She continues to endorse current active suicidal ideations, denies suicidal plans. Patient denies any homicidal ideations. She denies auditory and visual hallucinations. Patient did not endorse any delusions or paranoid thoughts. PSYCHIATRIC HISTORY:    Diagnoses: Depression, borderline personality disorder, PTSD  Suicide attempts/gestures: 5 times by overdose on medication and hanging herself. The patient reported history of self-injurious behavioral but cutting herself and burning herself since age 15years old and she continues to do this 1-2 times a month \"to feel the pain\". Prior hospitalizations: [place] 2017, [facility] psychiatric unit in December 2021  Medication trials: Prozac, Lexapro, Zoloft, Effexor, Wellbutrin, Lamictal, trazodone, Seroquel  Mental health contact: Currently primary care provider manages patient's psychotropic medications  Head trauma: Denies     Hospital Course:   Patient was admitted to the adult psych behavioral health floor and was acclimated to the floor.  Labs were reviewed and physical exam was completed by Dr. Christos Cope and associates. Home medications were reconciled. CHAPARRO was obtained and reviewed. Medication changes were made and patient tolerated well with no side effects. During the hospital stay patient's home medications have been restarted at previously prescribed and recommended dose, with the exception of dose of Lamictal was increased from 100 mg twice a day to 150 mg twice a day for depression and mood stabilization. Patient was tapering from Wellbutrin and the dose was decreased to 200 mg once a day for 3 days and then for 100 mg for next 3 days and then it was discontinued. Patient has been started on Cymbalta 30 mg once a day for depression. Dose of Cymbalta has been titrated up as indicated and tolerated by patient. At time of discharge dose of Cymbalta is 60 mg once a day. Trazodone was discontinued due to reported by patient feeling of sleepiness and tiredness during the day, and patient has been started on doxepin 10 mg at bedtime for insomnia. Dose of doxepin has been titrated up as indicated and tolerated by patient, and at time of discharge dose of doxepin is 100 mg at bedtime. Also, patient has been prescribed Hydroxyzine 25 mg 3 times a day as needed for anxiety with beneficial effect. While in the hospital patient has been diagnosed with vitamin D deficiency and she has been started on vitamin D 50,000 units weekly. Patient attended and participated in groups. The patient did interact well with other patients and staff on the unit. Behaviorally she was not a problem. Patient was compliant with her medications. Patient was sleeping through the night. This patient is not suicidal, homicidal or psychotic at discharge. She does not present a danger to self or others. With the above mentioned medications changes as well as psychotherapeutic interventions, the patient reported considerable improvement in her condition.  On 10/24/2022 it was therefore decided to discharge the patient, as it was felt that she received maximal benefit from her hospitalization.       Number of antipsychotic medication prescribed at discharge: None  IF MORE THAN ONE IS USED: NA    History of greater than 3 failed multiple monotherapy trials: NA  Monotherapy taper plan/ cross taper in progress: NA  Augmentation of Clozapine: NA    Referral to addiction treatment: Patient refused    Prescription for Alcohol or Drug Disorder Medication: There are no FDA approved medications for cannabis use disorder    Prescription for Tobacco Cessation medication: NA    If no prescriptions for Tobacco Cessation must document why: NA    Consults: Internal medicine    Significant Diagnostic Studies:     Lab Results   Component Value Date    WBC 6.2 10/17/2022    HGB 13.5 10/17/2022    HCT 41.8 10/17/2022    MCV 90.7 10/17/2022     10/17/2022     Lab Results   Component Value Date     (L) 10/17/2022    K 4.1 10/17/2022    CL 98 10/17/2022    CO2 26 10/17/2022    BUN 8 10/17/2022    CREATININE 0.7 10/17/2022    GLUCOSE 96 10/17/2022    CALCIUM 9.1 10/17/2022    PROT 7.2 10/17/2022    LABALBU 4.1 10/17/2022    BILITOT <0.2 10/17/2022    ALKPHOS 70 10/17/2022    AST 12 10/17/2022    ALT 7 10/17/2022    LABGLOM >60 10/17/2022    GFRAA >59 12/14/2021       Lab Results   Component Value Date    BHOYEPJW29 533 10/19/2022     Lab Results   Component Value Date    VITD25 14.7 (L) 10/19/2022        Treatments: therapies:     Mental Status Examination:  Alert, Oriented X 4  Appearance:  Proper Grooming and Hygiene  Speech with Regular Rate and Rhythm  Eye Contact:  Good  No Psychomotor Agitation/Retardation Noted  Attitude:  Cooperative  Mood:  \"Good\"  Affective: Congruent, appropriate to the situation, with a normal range and intensity  Thought Processes:  Coherently communicated, logical and goal oriented  Thought Content:  No Suicidal Ideation, No Homicidal Ideation, No Auditory or Visual Hallucinations, No Overt Delusions  Insight: Improved  Judgement: Improved  Memory is intact for both remote and recent  Intellectual Functioning:  Within the [address] of Knowledge:  Adequate  Attention and Concentration:  Adequate      Discharge Exam:  Please, see medical note    Disposition: home    Patient Instructions:   Current Discharge Medication List        START taking these medications    Details   melatonin 5 MG TBDP disintegrating tablet Take 1 tablet by mouth nightly  Qty: 30 tablet, Refills: 1      DULoxetine (CYMBALTA) 60 MG extended release capsule Take 1 capsule by mouth daily  Qty: 30 capsule, Refills: 1      doxepin (SINEQUAN) 100 MG capsule Take 1 capsule by mouth nightly  Qty: 30 capsule, Refills: 1      hydrOXYzine HCl (ATARAX) 25 MG tablet Take 1 tablet by mouth 3 times daily as needed for Anxiety  Qty: 90 tablet, Refills: 1           CONTINUE these medications which have CHANGED    Details   vitamin D (ERGOCALCIFEROL) 1.25 MG (44371 UT) CAPS capsule Take 1 capsule by mouth once a week for 11 doses  Qty: 11 capsule, Refills: 0      dicyclomine (BENTYL) 20 MG tablet Take 1 tablet by mouth daily  Qty: 30 tablet, Refills: 1      lamoTRIgine (LAMICTAL) 150 MG tablet Take 1 tablet by mouth 2 times daily  Qty: 60 tablet, Refills: 1           CONTINUE these medications which have NOT CHANGED    Details   omeprazole (PRILOSEC) 20 MG delayed release capsule Take 40 mg by mouth daily           STOP taking these medications       QUEtiapine (SEROQUEL XR) 150 MG TB24 extended release tablet Comments:   Reason for Stopping:         buPROPion (WELLBUTRIN SR) 150 MG extended release tablet Comments:   Reason for Stopping:         traZODone (DESYREL) 50 MG tablet Comments:   Reason for Stopping:         vitamin B-12 500 MCG tablet Comments:   Reason for Stopping:             Activity: activity as tolerated  Diet: regular diet  Wound Care: none needed    Follow-up with Navid Panchal provider in 2 weeks.     Time worked: More than 31 minutes    Participation: good    Electronically signed by Jac Sanchez MD on 10/24/2022 at 8:37 AM

## 2022-10-24 NOTE — PROGRESS NOTES
[facility] Adult Unit Daily Assessment  Nursing Progress Note    Room: [facility]-01   Name: Geo Tanner   Age: 25 y.o. Gender: female   Dx: Depression with suicidal ideation  Precautions: close watch and suicide risk  Inpatient Status: voluntary       SLEEP:  Sleep Quality Good  Sleep Medications: Yes   PRN Sleep Meds: Yes       MEDICAL:  Other PRN Meds: Yes   Med Compliant: Yes  Accu-Chek: No  Oxygen/CPAP/BiPAP: No  CIWA/CINA: No   PAIN Assessment: none  Side Effects from medication: No      Metabolic Screening:  No results found for: LABA1C  No results found for: CHOL  No results found for: TRIG  No results found for: HDL  No components found for: LDLCAL  No results found for: LABVLDL  Body mass index is 41.48 kg/m². BP Readings from Last 2 Encounters:   10/23/22 97/68   12/20/21 120/78         Medical Bed:   Is patient in a medical bed? no   If medical bed is in use, has nursing secured room while patient is awake and out of the room? NA  Has safety checks by nursing been completed on the bed/room this shift? NA    Protective Factors:  Patient identifies protective factors with nursing staff as follows: Identifies reasons for living: Yes   Supportive Social Network or family: Yes    Belief that suicide is immoral/high spirituality: Yes   Responsibility to family or others/living with family: Yes   Fear of death or dying due to pain and suffering: Yes   Engaged in work or school: Yes     If Patient is unable to identify, reason why? N/a      PSYCH:  Depression: 5   Anxiety: 1   SI denies suicidal ideation   Risk of Suicide: No Risk  HI Negative for homicidal ideation      AVH:no If Hallucinations are present, describe? GENERAL:  Appetite: good   Percent Meals:    Social: Yes   Speech: normal   Appearance: appropriately dressed and healthy looking    GROUP:  Group Participation: Yes  Participation Quality: Interactive    Notes: Patient was in the dayroom socializing with her peers.  Alert and oriented x4, pleasant, and cooperative. Patient was anxious, feels helpless and hopeless. Patient stated that she was planning on getting more involved with her hobbies such as drawing. Patient stated she was ready to go back home and spend time with her friends. Continue to monitor for safety.        Electronically signed by Lisbeth Peralta RN on 10/24/22 at 4:09 AM CDT

## 2022-10-24 NOTE — DISCHARGE INSTRUCTIONS
Medications:   Medication Summary Provided. I understand that I should take only the medications on my list.   --why and when I need to take each medication. --which side effects to watch for.   --that I should carry my medication information at all times in case of emergency    Situations. --I will take all medications until discontinued by physician. I will take all my medications to follow up appointments. --check with my physician or pharmacist before taking any new medication, over    the counter product or drink alcohol.   --ask about food, drug and dietary supplement interactions. --discard old lists and update records with medication providers. Behavior Health Follow Up:  Original Referral Source: ED  Discharge Diagnosis: Major depressive disorder, recurrent, severe, without psychotic features  Recomendations for Level of Care: Follow Up  Patient Status at Discharge: Stable  [facility]  was: [facility]  Aftercare plan faxed:    Faxed by: Social Work staff   Date: 10/24/22  Prescriptions sent to pt pharmacy.     General Information:   Questions regarding your bill: Call [phone]   Suicide Hotline (Rescue Crisis) 5-869.953.8333   To obtain results of pending studies call Medical Records at: [phone]   For emergencies and 24 hour/7 days a week contact information: [phone]

## 2022-10-24 NOTE — PROGRESS NOTES
Group Therapy Note    Start Time: 800  End Time:  708  Number of Participants: 9    Type of Group: Community Meeting       Patient's Goal:        Notes:  patient didn't attend group today    Participation Level:  Active Listener       Participation Quality: Appropriate      Thought Process/Content: Logical      Affective Functioning: Congruent      Mood:  calm      Level of consciousness:  Alert      Modes of Intervention: Support      Discipline Responsible: Behavioral Health Tech II      Signature:  Vielka Zarate

## 2022-10-24 NOTE — DISCHARGE INSTR - DIET

## 2022-10-24 NOTE — PLAN OF CARE
Group Therapy Note    Date: 10/24/2022  Start Time: 1000  End Time:  1030  Number of Participants: 5    Type of Group: Psychoeducation    Wellness Binder Information  Module Name:  Men's Issues  Session Number:  1    Group Goal for Pt: To improve knowledge of effective stress management techniques    Notes:  Pt did not attend group discussion. Pt was invited/encouraged. Status After Intervention:      Participation Level:     Participation Quality:       Speech:         Thought Process/Content:       Affective Functioning:       Mood:       Level of consciousness:        Response to Learning:       Endings:     Modes of Intervention:       Discipline Responsible:       Signature:  Rodney Salvador

## 2022-10-24 NOTE — PROGRESS NOTES
Treatment Team Note:    Target Symptoms/Reason for admission: Per nursing admission assessment - Reason for Admission: Per ER provider note -Per ER provider admit note -Min aDng a 25 y.o. female who presents to the emergency department for evaluation of depression and suicidal thoughts. Pt tells me that she has history of depression followed by he pcp. She relates that she has had thoughts of suicide recently discussing this today with her therapist. She tells me that she has reoccurring thoughts of \"just dying\". She does not endorse specific plan for suicide to me. She tells me that she has been sleeping more than usual. She relates that she has had recent treatment for sinus infection with overall improvement. She does not endorse hallucinations or homicidal thoughts. She admits to smoking marijuana, drinks liquor on weekends and tells me that she lives with roommate, attends college studying to be an . Diagnoses per psych provider: Depression with suicidal ideation [F32. A, R45.851]  Major depressive disorder, recurrent severe without psychotic features ([facility] [F33.2]    Therapist met with treatment team to discuss patients treatment and discharge plans. Patient's aftercare plan is: SW will meet with patient to gather information    Aftercare appointments made: No - SW will make discharge appointments    [facility]    Collateral obtained from:  [facility]  Collateral obtained on:10/18/22    Attending groups: Yes    Behavior: calm and cooperative, social with peers, reports improvement in depression.     Has patient been completing ADL's:  Yes    SI:  patient denies SI  Plan: no   If yes describe: N/A - patient denies plan  HI:  patient denies HI  If present describe: N/A  Delusions: patient denies delusions  If present describe: N/A  Hallucinations: patient denies hallucinations  If present describe: N/A    Patient rates their -- Depression: 1-10:  5  Anxiety:1-10: 0    Sleeping: Fair    Taking medication: Yes    Misc: Pt will be discharged today.

## 2022-10-24 NOTE — PROGRESS NOTES
WRAP UP GROUP NOTE:     Patient's Goal:  Providing feedback as to their own progress in the care-plan provided. Pt's have an opportunity to explore self-reflective skills and share any additional cares and concerns not yet addressed. Pt effectively participated. Energy level: normal  Appetite: good  Concentration:  normal  Hallucinations: gone  Depression: same  Anxiety: gone  How I worked today: tried a little  What helps me sleep: read, work in my journal  Any questions/complaints/comments: \"last full night here! You all have been my saviors. I'm so grateful for all the effort and hours you put in. Thank you so much! \"    Groups/activities that helped me today were:  Self-enhancement - \"cards, drawing\"    Electronically signed by Franklin Perez on 10/23/2022 at 8:55 PM

## 2022-10-24 NOTE — PROGRESS NOTES
Behavioral Health   Discharge Note  Bridge Appointment completed: Reviewed Discharge Instructions with patient. Patient verbalizes understanding and agreement with the discharge plan using the teachback method. Referral for Outpatient Tobacco Cessation Counseling, upon discharge (rene X if applicable and completed):    ( )  Hospital staff assisted patient to call Quit Line or faxed referral                                   during hospitalization                  ( )  Recognizing danger situations (included triggers and roadblocks), if not completed on admission                    ( )  Coping skills (new ways to manage stress, exercise, relaxation techniques, changing routine, distraction), if not completed on admission                                                           ( )  Basic information about quitting (benefits of quitting, techniques in how to quit, available resources, if not completed on admission  ( ) Referral for counseling faxed to [facility]   ( ) Patient refused referral  ( ) Patient refused counseling  ( ) Patient refused smoking cessation medication upon discharge  (X) Non-smoker    Vaccinations (rene X if applicable and completed):  ( ) Patient states already received influenza vaccine elsewhere  ( ) Patient received influenza vaccine during this hospitalization  (X) Patient refused influenza vaccine at this time      Pt discharged with followings belongings:   Dental Appliances: None  Vision - Corrective Lenses: Eyeglasses  Hearing Aid: None  Jewelry: None  Body Piercings Removed: N/A  Clothing:  (see personal effects sheet)  Other Valuables:  (see personal effects sheet)     Valuables, belongings, and medications sent home with pt. Valuables retrieved from safe and returned to patient. Patient left department with [place] RN via transportation provided by friend/family, discharged to home/self-care.  Patient education on aftercare instructions: yes  Patient verbalize understanding of AVS:  yes. Suicidal Ideations? No Risk of Suicide: No Risk AVH? denies HI? Negative for homicidal ideation         Status EXAM upon discharge:  Mental Status and Behavioral Exam  Normal: Yes  Level of Assistance: Independent/Self  Facial Expression: Brightened  Affect: Congruent  Level of Consciousness: Alert  Frequency of Checks: 4 times per hour, close  Mood:Normal: No  Mood: Depressed, Anxious  Motor Activity:Normal: Yes  Motor Activity:  (within normal limits)  Eye Contact: Good  Observed Behavior: Friendly, Cooperative  Sexual Misconduct History: Current - no  Preception: Locke to person, Locke to time, Locke to place, Locke to situation  Attention:Normal: Yes  Attention:  (concentration intact)  Thought Processes:  (logical/goal-oriented)  Thought Content:Normal: Yes  Thought Content:  (denies SI, HI, and AVH)  Depression Symptoms: Feelings of helplessness, Feelings of hopelessess (rates depression 5)  Anxiety Symptoms: Generalized (rates anxiety 2)  [place] Symptoms: No problems reported or observed. Hallucinations: None  Delusions: No  Delusions:  (denies)  Memory:Normal: Yes  Memory:  (recent and remote intact)  Insight and Judgment: Yes  Insight and Judgment:  (improved)    AVS/Transition Record has been discussed with patient and a copy was given to the patient. The AVS/Transition Record was faxed to the next level of care today.     Electronically signed by Ignacio Harden RN on 10/24/2022 at 1:09 PM

## 2022-10-25 NOTE — PROGRESS NOTES
Progress Note  Faizan Glendale  10/25/2022 8:57 AM  Subjective:   Admit Date:   10/17/2022      CC/ADMIT DX:       Interval History:   Reviewed overnight events and nursing notes. She denies any new medical issues. I have reviewed all labs/diagnostics from the last 24hrs. ROS:   I have done a 10 point ROS and all are negative, except what is mentioned in the HPI. No diet orders on file    Medications:   REM  REM          Objective:   Vitals: /69   Pulse 62   Temp 97.5 °F (36.4 °C) (Temporal)   Resp 16   Ht 5' 6\" (1.676 m)   Wt 257 lb 0.2 oz (116.6 kg)   SpO2 99%   BMI 41.48 kg/m²  No intake or output data in the 24 hours ending 10/25/22 0857  General appearance: alert and cooperative with exam  Extremities: extremities normal, atraumatic, no cyanosis or edema  Neurologic:  No obvious focal neurologic deficits. Skin: no rashes    Assessment and Plan:   Principal Problem:    Depression with suicidal ideation  Active Problems:    Major depressive disorder, recurrent severe without psychotic features ([address]  Resolved Problems:    * No resolved hospital problems. *    Vit D Def    Plan:   Continue present medication(s)    She is medically stable. I will monitor for any changes or concerns. Follow with Psych      Discharge planning:   her home     Reviewed treatment plans with the patient and/or family.              Electronically signed by Tia Galdamez MD on 10/25/2022 at 8:57 AM

## 2023-01-13 RX ORDER — DULOXETIN HYDROCHLORIDE 60 MG/1
CAPSULE, DELAYED RELEASE ORAL
Qty: 30 CAPSULE | Refills: 0 | OUTPATIENT
Start: 2023-01-13

## 2023-01-13 RX ORDER — LAMOTRIGINE 150 MG/1
TABLET ORAL
Qty: 60 TABLET | Refills: 0 | OUTPATIENT
Start: 2023-01-13

## 2023-01-19 RX ORDER — LAMOTRIGINE 150 MG/1
TABLET ORAL
Qty: 60 TABLET | Refills: 0 | OUTPATIENT
Start: 2023-01-19

## 2023-01-19 RX ORDER — DULOXETIN HYDROCHLORIDE 60 MG/1
CAPSULE, DELAYED RELEASE ORAL
Qty: 30 CAPSULE | Refills: 0 | OUTPATIENT
Start: 2023-01-19